# Patient Record
Sex: FEMALE | Race: WHITE | NOT HISPANIC OR LATINO | Employment: UNEMPLOYED | URBAN - NONMETROPOLITAN AREA
[De-identification: names, ages, dates, MRNs, and addresses within clinical notes are randomized per-mention and may not be internally consistent; named-entity substitution may affect disease eponyms.]

---

## 2021-09-16 ENCOUNTER — OFFICE VISIT (OUTPATIENT)
Dept: CARDIOLOGY | Facility: CLINIC | Age: 51
End: 2021-09-16

## 2021-09-16 VITALS
OXYGEN SATURATION: 97 % | BODY MASS INDEX: 47.09 KG/M2 | SYSTOLIC BLOOD PRESSURE: 141 MMHG | TEMPERATURE: 97.3 F | HEIGHT: 66 IN | DIASTOLIC BLOOD PRESSURE: 76 MMHG | HEART RATE: 98 BPM | WEIGHT: 293 LBS

## 2021-09-16 DIAGNOSIS — I10 ESSENTIAL HYPERTENSION: ICD-10-CM

## 2021-09-16 DIAGNOSIS — I45.10 RBBB (RIGHT BUNDLE BRANCH BLOCK): ICD-10-CM

## 2021-09-16 DIAGNOSIS — I73.9 CLAUDICATION (HCC): ICD-10-CM

## 2021-09-16 DIAGNOSIS — G47.33 OSA (OBSTRUCTIVE SLEEP APNEA): ICD-10-CM

## 2021-09-16 DIAGNOSIS — R42 DIZZINESS: ICD-10-CM

## 2021-09-16 DIAGNOSIS — R09.89 DECREASED PEDAL PULSES: ICD-10-CM

## 2021-09-16 DIAGNOSIS — E78.5 HYPERLIPIDEMIA, UNSPECIFIED HYPERLIPIDEMIA TYPE: ICD-10-CM

## 2021-09-16 DIAGNOSIS — R94.31 EKG ABNORMALITIES: Primary | ICD-10-CM

## 2021-09-16 DIAGNOSIS — R60.9 PERIPHERAL EDEMA: ICD-10-CM

## 2021-09-16 DIAGNOSIS — R06.02 SHORTNESS OF BREATH: ICD-10-CM

## 2021-09-16 PROBLEM — J30.2 SEASONAL ALLERGIES: Status: ACTIVE | Noted: 2021-09-16

## 2021-09-16 PROBLEM — E11.42 TYPE 2 DIABETES MELLITUS WITH PERIPHERAL NEUROPATHY: Status: ACTIVE | Noted: 2021-09-16

## 2021-09-16 PROBLEM — E11.9 DM (DIABETES MELLITUS), TYPE 2: Status: ACTIVE | Noted: 2021-09-16

## 2021-09-16 PROBLEM — R60.0 PERIPHERAL EDEMA: Status: ACTIVE | Noted: 2021-09-16

## 2021-09-16 PROBLEM — M54.9 BACK PAIN: Status: ACTIVE | Noted: 2021-09-16

## 2021-09-16 PROCEDURE — 93000 ELECTROCARDIOGRAM COMPLETE: CPT | Performed by: NURSE PRACTITIONER

## 2021-09-16 PROCEDURE — 99204 OFFICE O/P NEW MOD 45 MIN: CPT | Performed by: NURSE PRACTITIONER

## 2021-09-16 RX ORDER — INSULIN DEGLUDEC INJECTION 100 U/ML
100 INJECTION, SOLUTION SUBCUTANEOUS DAILY
COMMUNITY

## 2021-09-16 RX ORDER — ATORVASTATIN CALCIUM 10 MG/1
10 TABLET, FILM COATED ORAL DAILY
COMMUNITY

## 2021-09-16 RX ORDER — SOLIFENACIN SUCCINATE 5 MG/1
5 TABLET, FILM COATED ORAL DAILY
COMMUNITY

## 2021-09-16 RX ORDER — ASPIRIN 81 MG/1
81 TABLET ORAL DAILY
Qty: 90 TABLET | Refills: 3 | Status: SHIPPED | OUTPATIENT
Start: 2021-09-16

## 2021-09-16 RX ORDER — AMLODIPINE BESYLATE 10 MG/1
10 TABLET ORAL DAILY
COMMUNITY

## 2021-09-16 RX ORDER — GABAPENTIN 600 MG/1
600 TABLET ORAL 2 TIMES DAILY
COMMUNITY

## 2021-09-16 RX ORDER — ACETAMINOPHEN 500 MG
500 TABLET ORAL EVERY 6 HOURS PRN
COMMUNITY

## 2021-09-16 RX ORDER — BUMETANIDE 1 MG/1
1 TABLET ORAL AS NEEDED
COMMUNITY

## 2021-09-16 RX ORDER — DULAGLUTIDE 1.5 MG/.5ML
1.5 INJECTION, SOLUTION SUBCUTANEOUS WEEKLY
COMMUNITY
End: 2022-07-18

## 2021-09-16 RX ORDER — DULOXETIN HYDROCHLORIDE 60 MG/1
60 CAPSULE, DELAYED RELEASE ORAL DAILY
COMMUNITY

## 2021-09-16 NOTE — PATIENT INSTRUCTIONS
Right Bundle Branch Block    Right bundle branch block (RBBB) is a problem with the way that electrical impulses pass through the heart (electrical conduction abnormality). The heart depends on an electrical pulse to beat normally. The electrical signal for a heartbeat starts in the upper chambers of the heart (atria) and then travels to the two lower chambers (left and rightventricles). An RBBB is a partial or complete block of the pathway that carries the signal to the right ventricle. If you have RBBB, the right side of your heart beats a little more slowly than the left side.  RBBB may be a warning of heart disease or a lung problem.  What are the causes?  This condition may be caused by:  · Heart attack (myocardial infarction).  · Being born with a heart defect (congenital heart disease).  · A blood clot that flows into the lung (pulmonary embolism).  · Infection of heart muscle (myocarditis).  · High blood pressure.  In some cases, the cause may not be known.  What increases the risk?  The following factors may make you more likely to develop this condition:  · Being male.  · Being 50 years of age or older.  · Having heart disease.  · Having had a heart attack or heart surgery.  · Having an enlarged heart.  · Having a hole in the walls between the chambers of the heart (septal defect).  What are the signs or symptoms?  This condition does not typically cause symptoms.  How is this diagnosed?  This condition may be diagnosed based on an electrocardiogram (ECG). It is often diagnosed when an ECG is done as part of a routine physical.  You may also have imaging tests to find out more about your condition. These may include:  · Chest X-rays.  · Echocardiogram.  How is this treated?  Treatment may not be needed for this condition if you do not have symptoms or any other heart problems. However, you may need to see your health care provider more often because RBBB can be a warning sign of future heart or lung  problems. You may need treatment for another condition that may be causing RBBB.  Follow these instructions at home:  Lifestyle    · Follow instructions from your health care provider about eating or drinking restrictions.  · Follow a heart-healthy diet and maintain a healthy weight. Work with a dietitian to create an eating plan that is best for you.  · Do not use any products that contain nicotine or tobacco, such as cigarettes, e-cigarettes, and chewing tobacco. If you need help quitting, ask your health care provider.    Activity  · Get regular exercise as told by your health care provider.  · Return to your normal activities as told by your health care provider. Ask your health care provider what activities are safe for you.  General instructions  · Take over-the-counter and prescription medicines only as told by your health care provider.  · Keep all follow-up visits as told by your health care provider. This is important.  Contact a health care provider if:  · You are light-headed.  · You faint.  Get help right away if:  · You have chest pain.  · You have trouble breathing.  These symptoms may represent a serious problem that is an emergency. Do not wait to see if the symptoms will go away. Get medical help right away. Call your local emergency services (911 in the [place] Do not drive yourself to the hospital.   Summary  · For the heart to beat normally, an electrical signal must travel to the heart's lower right chamber. Right bundle branch block (RBBB) is a partial or complete block of the pathway that carries that signal.  · This condition does not typically cause symptoms.  · Treatment may not be needed for RBBB if you do not have symptoms or any other heart problems.  · You may need to see your health care provider more often because RBBB can be a warning sign of future heart or lung problems.  This information is not intended to replace advice given to you by your health care provider. Make sure you  discuss any questions you have with your health care provider.  Document Revised: 06/16/2020 Document Reviewed: 06/16/2020  Elsevier Patient Education © 2021 [employer]    Edema    Edema is an abnormal buildup of fluids in the body tissues and under the skin. Swelling of the legs, feet, and ankles is a common symptom that becomes more likely as you get older. Swelling is also common in looser tissues, like around the eyes. When the affected area is squeezed, the fluid may move out of that spot and leave a dent for a few moments. This dent is called pitting edema.  There are many possible causes of edema. Eating too much salt (sodium) and being on your feet or sitting for a long time can cause edema in your legs, feet, and ankles. Hot weather may make edema worse. Common causes of edema include:  · Heart failure.  · Liver or kidney disease.  · Weak leg blood vessels.  · Cancer.  · An injury.  · Pregnancy.  · Medicines.  · Being obese.  · Low protein levels in the blood.  Edema is usually painless. Your skin may look swollen or shiny.  Follow these instructions at home:  · Keep the affected body part raised (elevated) above the level of your heart when you are sitting or lying down.  · Do not sit still or stand for long periods of time.  · Do not wear tight clothing. Do not wear garters on your upper legs.  · Exercise your legs to get your circulation going. This helps to move the fluid back into your blood vessels, and it may help the swelling go down.  · Wear elastic bandages or support stockings to reduce swelling as told by your health care provider.  · Eat a low-salt (low-sodium) diet to reduce fluid as told by your health care provider.  · Depending on the cause of your swelling, you may need to limit how much fluid you drink (fluid restriction).  · Take over-the-counter and prescription medicines only as told by your health care provider.  Contact a health care provider if:  · Your edema does not get better  with treatment.  · You have heart, liver, or kidney disease and have symptoms of edema.  · You have sudden and unexplained weight gain.  Get help right away if:  · You develop shortness of breath or chest pain.  · You cannot breathe when you lie down.  · You develop pain, redness, or warmth in the swollen areas.  · You have heart, liver, or kidney disease and suddenly get edema.  · You have a fever and your symptoms suddenly get worse.  Summary  · Edema is an abnormal buildup of fluids in the body tissues and under the skin.  · Eating too much salt (sodium) and being on your feet or sitting for a long time can cause edema in your legs, feet, and ankles.  · Keep the affected body part raised (elevated) above the level of your heart when you are sitting or lying down.  This information is not intended to replace advice given to you by your health care provider. Make sure you discuss any questions you have with your health care provider.  Document Revised: 05/06/2020 Document Reviewed: 01/20/2018  [employer] Patient Education © 2021 [employer] Inc.

## 2021-09-16 NOTE — PROGRESS NOTES
Subjective   Meri Rankin is a 51 y.o. female     Chief Complaint   Patient presents with   • [facility]    Problem list:    1.  Hypertension  2.  Hyperlipidemia  3.  Claudication  3.1 bilateral extremity arterial ultrasound 11/13/2019-no evidence to indicate high-grade/significant peripheral vascular disease  4.  Right bundle branch block  5.  Type 2 diabetes mellitus for 30 years  6.  Shortness of breath  6.1 echo 1/11/2018-EF 60%, diastolic inflow shows normal relaxation  7.  REBECCA currently no CPAP, Dr. Valle    Patient is a 51-year-old female who presents today as she is needing cardiac clearance for bariatric surgery.  She denies any chest pain, pressure, palpitations, fluttering, presyncope, syncope, orthopnea or PND.  Patient says that she does have dizziness if she is lying down and gets up quickly.  She says she has swelling in her legs and it does not go down.  She says the thing that helps the most is Bumex and elevation.  She says she does get short of breath with more than normal activity.  She says she is fatigued all the time.  Patient says that she is able to walk around the grocery store without having shortness of breath.  Patient does complain of claudication.  She has significant swelling in her legs.    Patient quit smoking 12 years ago but smoked a pack a day for 30 years; no alcohol or illicit drugs  She drinks 6 diet Mountain Dew's per day; no coffee or tea    Mom 79 alive-diabetes, smoker  Dad 78 alive-diabetes, dialysis    Patient's labs show creatinine of 1.5, potassium of 5.2, her LDL was 68, triglycerides 178.  A1c was 9.    Current Outpatient Medications on File Prior to Visit   Medication Sig Dispense Refill   • acetaminophen (TYLENOL) 500 MG tablet Take 500 mg by mouth Every 6 (Six) Hours As Needed for Mild Pain .     • amLODIPine (NORVASC) 10 MG tablet Take 10 mg by mouth Daily.     • atorvastatin (LIPITOR) 10 MG tablet Take 10 mg by mouth Daily.     • bumetanide (BUMEX)  1 MG tablet Take 1 mg by mouth Daily.     • Dulaglutide (Trulicity) 1.5 MG/0.5ML solution pen-injector Inject 1.5 mL under the skin into the appropriate area as directed 1 (One) Time Per Week.     • DULoxetine (CYMBALTA) 60 MG capsule Take 60 mg by mouth Daily. Total of 90 mg - 60 mg and 30 mg tablet in the am together     • empagliflozin (Jardiance) 25 MG tablet tablet Take 25 mg by mouth Daily.     • gabapentin (NEURONTIN) 600 MG tablet Take 600 mg by mouth 2 (Two) Times a Day.     • insulin degludec (Tresiba FlexTouch) 100 UNIT/ML solution pen-injector injection Inject 100 Units under the skin into the appropriate area as directed Daily. Sliding scale     • linaclotide (LINZESS) 145 MCG capsule capsule Take 145 mcg by mouth Every Morning Before Breakfast.     • solifenacin (VESIcare) 5 MG tablet Take 5 mg by mouth Daily.       No current facility-administered medications on file prior to visit.       ALLERGIES    Latex    Past Medical History:   Diagnosis Date   • Chronic kidney disease    • COVID-19 vaccine administered 2021 - Moderna    • Diabetes mellitus (CMS/[facility])    • Glaucoma    • Hyperlipidemia    • Hypertension    • Sleep apnea        Social History     Socioeconomic History   • Marital status: Single     Spouse name: Not on file   • Number of children: Not on file   • Years of education: Not on file   • Highest education level: Not on file   Tobacco Use   • Smoking status: Former Smoker     Packs/day: 1.00     Years: 30.00     Pack years: 30.00     Types: Cigarettes     Quit date: 2012     Years since quittin.5   • Smokeless tobacco: Never Used   Substance and Sexual Activity   • Alcohol use: Never   • Drug use: Never   • Sexual activity: Defer       Family History   Problem Relation Age of Onset   • Hypertension Mother    • Hyperlipidemia Mother    • Diabetes Mother    • Parkinsonism Mother    • Cancer Father    • Kidney failure Father    • Hypertension Father    •  "Hyperlipidemia Father        Review of Systems   Constitutional: Positive for fatigue (tired all the time ). Negative for appetite change, chills and diaphoresis.   HENT: Negative for congestion, rhinorrhea and sore throat.    Eyes: Positive for visual disturbance (glasses ).   Respiratory: Positive for shortness of breath (with more than normal activity, stairs, hills ). Negative for cough, chest tightness and wheezing.    Cardiovascular: Positive for leg swelling (legs, feet and ankles ( swelling goes down at night ); bumex and elevation helps ). Negative for chest pain and palpitations.   Gastrointestinal: Positive for constipation (Chronic pt has meds to help ). Negative for abdominal pain, blood in stool, diarrhea, nausea and vomiting.   Endocrine: Negative for cold intolerance.   Genitourinary: Negative for difficulty urinating, dysuria, frequency, hematuria and urgency.   Musculoskeletal: Positive for back pain (lower ), joint swelling (fingers , thumbs ) and neck stiffness (stiffiness with turning her head either direction she has limited turning ). Negative for arthralgias and neck pain.   Skin: Negative for pallor, rash and wound.   Allergic/Immunologic: Positive for environmental allergies. Negative for food allergies.   Neurological: Positive for dizziness (if lying down and sit up really fast ), weakness (legs) and numbness (hands.legs and feet ). Negative for light-headedness and headaches.   Hematological: Does not bruise/bleed easily.   Psychiatric/Behavioral: Positive for sleep disturbance (sleep apnea , insurance took the machine due to her not using it ).       Objective   /76 (BP Location: Left arm)   Pulse 98   Temp 97.3 °F (36.3 °C)   Ht 167.6 cm (66\")   Wt (!) 150 kg (331 lb)   SpO2 97%   BMI 53.42 kg/m²   Vitals:    09/16/21 1609   BP: 141/76   BP Location: Left arm   Pulse: 98   Temp: 97.3 °F (36.3 °C)   SpO2: 97%   Weight: (!) 150 kg (331 lb)   Height: 167.6 cm (66\")      Lab " Results (most recent)     None        Physical Exam  Vitals reviewed.   Constitutional:       General: She is awake.      Appearance: Normal appearance. She is well-developed and well-groomed. She is morbidly obese.   HENT:      Head: Normocephalic.   Eyes:      General: Lids are normal.   Neck:      Vascular: No carotid bruit, hepatojugular reflux or JVD.   Cardiovascular:      Rate and Rhythm: Normal rate and regular rhythm.      Pulses:           Radial pulses are 2+ on the right side and 2+ on the left side.      Heart sounds: Normal heart sounds.      Comments: Decreased pedal pulses  Pulmonary:      Effort: Pulmonary effort is normal.      Breath sounds: Normal breath sounds and air entry.   Abdominal:      General: Bowel sounds are normal.      Palpations: Abdomen is soft.   Musculoskeletal:      Right lower le+ Pitting Edema present.      Left lower le+ Pitting Edema present.   Skin:     General: Skin is warm and dry.      Findings: Erythema (BLE) present.   Neurological:      Mental Status: She is alert and oriented to person, place, and time.   Psychiatric:         Attention and Perception: Attention and perception normal.         Mood and Affect: Mood and affect normal.         Speech: Speech normal.         Behavior: Behavior normal. Behavior is cooperative.         Thought Content: Thought content normal.         Cognition and Memory: Cognition and memory normal.         Judgment: Judgment normal.         Procedure     ECG 12 Lead    Date/Time: 2021 5:02 PM  Performed by: Natacha Saravia APRN  Authorized by: Natacha Saravia APRN   Comparison: not compared with previous ECG   Rhythm: sinus rhythm  Rate: normal  BPM: 91  Conduction: right bundle branch block  Q waves: V3, V4, II and aVF    QRS axis: normal    Clinical impression: abnormal EKG                 Assessment/Plan      Diagnosis Plan   1. EKG abnormalities  aspirin (aspirin) 81 MG EC tablet    Stress Test With Myocardial Perfusion  One Day   2. Hyperlipidemia, unspecified hyperlipidemia type  Stress Test With Myocardial Perfusion One Day   3. Peripheral edema  Adult Transthoracic Echo Complete W/ Cont if Necessary Per Protocol    Duplex Lower Extremity Art / Grafts - Bilateral CAR   4. Essential hypertension  Stress Test With Myocardial Perfusion One Day    Adult Transthoracic Echo Complete W/ Cont if Necessary Per Protocol    ECG 12 Lead   5. REBECCA (obstructive sleep apnea)  Adult Transthoracic Echo Complete W/ Cont if Necessary Per Protocol   6. RBBB (right bundle branch block)  aspirin (aspirin) 81 MG EC tablet    Stress Test With Myocardial Perfusion One Day   7. Shortness of breath  Stress Test With Myocardial Perfusion One Day    Adult Transthoracic Echo Complete W/ Cont if Necessary Per Protocol   8. Decreased pedal pulses  Duplex Lower Extremity Art / Grafts - Bilateral CAR   9. Claudication (CMS/HCC)     10. Dizziness  Duplex Carotid Ultrasound CAR       Return in about 12 weeks (around 12/9/2021).    EKG abnormalities/hyperlipidemia/peripheral edema/hypertension/REBECCA/right bundle branch block/shortness of breath-patient will have an ischemia work-up, stress and echo.  She will start aspirin 81.  Decreased pedal pulses/claudication-patient on bilateral extremity arterial ultrasound.  Dizziness-patient have carotid artery ultrasound.  Patient needs her testing done as soon as possible as she has insurance change at the end of the month.  We will try to get her testing performed before then.  She will continue her medication regimen with above-noted change.  She will follow-up in 12 weeks or sooner if any changes or abnormalities with testing.       Meri Rankin  reports that she quit smoking about 9 years ago. Her smoking use included cigarettes. She has a 30.00 pack-year smoking history. She has never used smokeless tobacco..Patient brought in medicine list to appointment, it's been reviewed with patient and med list was updated in the  chart.     Electronically signed by:

## 2021-09-17 ENCOUNTER — TELEPHONE (OUTPATIENT)
Dept: CARDIOLOGY | Facility: CLINIC | Age: 51
End: 2021-09-17

## 2021-09-17 PROBLEM — R42 DIZZINESS: Status: ACTIVE | Noted: 2021-09-17

## 2021-09-17 NOTE — TELEPHONE ENCOUNTER
CARDIAC CLEARANCE RECEIVED THIS DATE, SCANNED, AND PLACED IN CLINICAL STAFF'S MAILBOX.    [facility] [facility] WEIGHT LOSS SURGERY  GASTRIC SLEEVE  09/28/2021

## 2021-09-23 ENCOUNTER — HOSPITAL ENCOUNTER (OUTPATIENT)
Dept: CARDIOLOGY | Facility: HOSPITAL | Age: 51
Discharge: HOME | End: 2021-09-23

## 2021-09-23 DIAGNOSIS — I10 ESSENTIAL HYPERTENSION: ICD-10-CM

## 2021-09-23 DIAGNOSIS — R06.02 SHORTNESS OF BREATH: ICD-10-CM

## 2021-09-23 DIAGNOSIS — E78.5 HYPERLIPIDEMIA, UNSPECIFIED HYPERLIPIDEMIA TYPE: ICD-10-CM

## 2021-09-23 DIAGNOSIS — R60.9 PERIPHERAL EDEMA: ICD-10-CM

## 2021-09-23 DIAGNOSIS — R09.89 DECREASED PEDAL PULSES: ICD-10-CM

## 2021-09-23 DIAGNOSIS — G47.33 OSA (OBSTRUCTIVE SLEEP APNEA): ICD-10-CM

## 2021-09-23 DIAGNOSIS — I45.10 RBBB (RIGHT BUNDLE BRANCH BLOCK): ICD-10-CM

## 2021-09-23 DIAGNOSIS — R42 DIZZINESS: ICD-10-CM

## 2021-09-23 DIAGNOSIS — R94.31 EKG ABNORMALITIES: ICD-10-CM

## 2021-09-23 PROCEDURE — 25010000002 REGADENOSON 0.4 MG/5ML SOLUTION: Performed by: INTERNAL MEDICINE

## 2021-09-23 PROCEDURE — 93018 CV STRESS TEST I&R ONLY: CPT | Performed by: SPECIALIST

## 2021-09-23 PROCEDURE — 93306 TTE W/DOPPLER COMPLETE: CPT

## 2021-09-23 PROCEDURE — A9500 TC99M SESTAMIBI: HCPCS | Performed by: INTERNAL MEDICINE

## 2021-09-23 PROCEDURE — 93017 CV STRESS TEST TRACING ONLY: CPT

## 2021-09-23 PROCEDURE — 93925 LOWER EXTREMITY STUDY: CPT | Performed by: INTERNAL MEDICINE

## 2021-09-23 PROCEDURE — 93880 EXTRACRANIAL BILAT STUDY: CPT

## 2021-09-23 PROCEDURE — 78452 HT MUSCLE IMAGE SPECT MULT: CPT | Performed by: SPECIALIST

## 2021-09-23 PROCEDURE — 93880 EXTRACRANIAL BILAT STUDY: CPT | Performed by: INTERNAL MEDICINE

## 2021-09-23 PROCEDURE — 0 TECHNETIUM SESTAMIBI: Performed by: INTERNAL MEDICINE

## 2021-09-23 PROCEDURE — 78452 HT MUSCLE IMAGE SPECT MULT: CPT

## 2021-09-23 PROCEDURE — 93925 LOWER EXTREMITY STUDY: CPT

## 2021-09-23 PROCEDURE — 93306 TTE W/DOPPLER COMPLETE: CPT | Performed by: SPECIALIST

## 2021-09-23 RX ADMIN — TECHNETIUM TC 99M SESTAMIBI 1 DOSE: 1 INJECTION INTRAVENOUS at 10:22

## 2021-09-23 RX ADMIN — TECHNETIUM TC 99M SESTAMIBI 1 DOSE: 1 INJECTION INTRAVENOUS at 08:34

## 2021-09-23 RX ADMIN — REGADENOSON 0.4 MG: 0.08 INJECTION, SOLUTION INTRAVENOUS at 10:22

## 2021-09-24 ENCOUNTER — TELEPHONE (OUTPATIENT)
Dept: CARDIOLOGY | Facility: CLINIC | Age: 51
End: 2021-09-24

## 2021-09-24 LAB
BH CV ECHO MEAS - ACS: 2.1 CM
BH CV ECHO MEAS - AO MAX PG: 8 MMHG
BH CV ECHO MEAS - AO MEAN PG: 5 MMHG
BH CV ECHO MEAS - AO ROOT AREA (BSA CORRECTED): 1.2
BH CV ECHO MEAS - AO ROOT AREA: 7.3 CM^2
BH CV ECHO MEAS - AO ROOT DIAM: 3.1 CM
BH CV ECHO MEAS - AO V2 MAX: 141 CM/SEC
BH CV ECHO MEAS - AO V2 MEAN: 109 CM/SEC
BH CV ECHO MEAS - AO V2 VTI: 33.5 CM
BH CV ECHO MEAS - BSA(HAYCOCK): 2.7 M^2
BH CV ECHO MEAS - BSA: 2.5 M^2
BH CV ECHO MEAS - BZI_BMI: 53.4 KILOGRAMS/M^2
BH CV ECHO MEAS - BZI_METRIC_HEIGHT: 167.6 CM
BH CV ECHO MEAS - BZI_METRIC_WEIGHT: 150.1 KG
BH CV ECHO MEAS - EDV(CUBED): 59.3 ML
BH CV ECHO MEAS - EDV(MOD-SP4): 123 ML
BH CV ECHO MEAS - EDV(TEICH): 65.9 ML
BH CV ECHO MEAS - EF(CUBED): 69.3 %
BH CV ECHO MEAS - EF(MOD-SP4): 62.4 %
BH CV ECHO MEAS - EF(TEICH): 61.6 %
BH CV ECHO MEAS - ESV(CUBED): 18.2 ML
BH CV ECHO MEAS - ESV(MOD-SP4): 46.3 ML
BH CV ECHO MEAS - ESV(TEICH): 25.3 ML
BH CV ECHO MEAS - FS: 32.6 %
BH CV ECHO MEAS - IVS/LVPW: 0.95
BH CV ECHO MEAS - IVSD: 1.6 CM
BH CV ECHO MEAS - LA DIMENSION: 4.5 CM
BH CV ECHO MEAS - LA/AO: 1.5
BH CV ECHO MEAS - LV DIASTOLIC VOL/BSA (35-75): 49.6 ML/M^2
BH CV ECHO MEAS - LV IVRT: 0.1 SEC
BH CV ECHO MEAS - LV MASS(C)D: 264.4 GRAMS
BH CV ECHO MEAS - LV MASS(C)DI: 106.7 GRAMS/M^2
BH CV ECHO MEAS - LV SYSTOLIC VOL/BSA (12-30): 18.7 ML/M^2
BH CV ECHO MEAS - LVIDD: 3.9 CM
BH CV ECHO MEAS - LVIDS: 2.6 CM
BH CV ECHO MEAS - LVLD AP4: 9.1 CM
BH CV ECHO MEAS - LVLS AP4: 7.8 CM
BH CV ECHO MEAS - LVOT AREA (M): 4.2 CM^2
BH CV ECHO MEAS - LVOT AREA: 4.2 CM^2
BH CV ECHO MEAS - LVOT DIAM: 2.3 CM
BH CV ECHO MEAS - LVPWD: 1.7 CM
BH CV ECHO MEAS - MV A MAX VEL: 99.8 CM/SEC
BH CV ECHO MEAS - MV DEC SLOPE: 277 CM/SEC^2
BH CV ECHO MEAS - MV E MAX VEL: 86.5 CM/SEC
BH CV ECHO MEAS - MV E/A: 0.87
BH CV ECHO MEAS - RVDD: 2.4 CM
BH CV ECHO MEAS - SI(AO): 98.8 ML/M^2
BH CV ECHO MEAS - SI(CUBED): 16.6 ML/M^2
BH CV ECHO MEAS - SI(MOD-SP4): 31 ML/M^2
BH CV ECHO MEAS - SI(TEICH): 16.4 ML/M^2
BH CV ECHO MEAS - SV(AO): 244.8 ML
BH CV ECHO MEAS - SV(CUBED): 41.1 ML
BH CV ECHO MEAS - SV(MOD-SP4): 76.7 ML
BH CV ECHO MEAS - SV(TEICH): 40.6 ML
BH CV NUCLEAR PRIOR STUDY: 3
BH CV REST NUCLEAR ISOTOPE DOSE: 10 MCI
BH CV STRESS COMMENTS STAGE 1: NORMAL
BH CV STRESS DOSE REGADENOSON STAGE 1: 0.4
BH CV STRESS DURATION MIN STAGE 1: 0
BH CV STRESS DURATION SEC STAGE 1: 10
BH CV STRESS NUCLEAR ISOTOPE DOSE: 30 MCI
BH CV STRESS PROTOCOL 1: NORMAL
BH CV STRESS RECOVERY BP: NORMAL MMHG
BH CV STRESS RECOVERY HR: 90 BPM
BH CV STRESS STAGE 1: 1
LV EF NUC BP: 51 %
MAXIMAL PREDICTED HEART RATE: 167 BPM
MAXIMAL PREDICTED HEART RATE: 167 BPM
PERCENT MAX PREDICTED HR: 54.49 %
STRESS BASELINE BP: NORMAL MMHG
STRESS BASELINE HR: 86 BPM
STRESS PERCENT HR: 64 %
STRESS POST PEAK BP: NORMAL MMHG
STRESS POST PEAK HR: 91 BPM
STRESS TARGET HR: 142 BPM
STRESS TARGET HR: 142 BPM

## 2021-09-24 NOTE — TELEPHONE ENCOUNTER
----- Message from HAROON Michelle sent at 9/24/2021  9:12 AM EDT -----  Please advise patient. Ok to clear for procedure.         Left mess for pt regarding her stress test and Echo results and that Cardiac Clearance was sent to  [facility]     Echo-ejection fraction appears to be 56 - 60%    Stress test - Findings consistent with a normal ECG stress test.    XAVIER Singletary

## 2021-09-26 LAB
BH CV ECHO MEAS - BSA(HAYCOCK): 2.7 M^2
BH CV ECHO MEAS - BSA(HAYCOCK): 2.7 M^2
BH CV ECHO MEAS - BSA: 2.5 M^2
BH CV ECHO MEAS - BSA: 2.5 M^2
BH CV ECHO MEAS - BZI_BMI: 53.4 KILOGRAMS/M^2
BH CV ECHO MEAS - BZI_BMI: 53.4 KILOGRAMS/M^2
BH CV ECHO MEAS - BZI_METRIC_HEIGHT: 167.6 CM
BH CV ECHO MEAS - BZI_METRIC_HEIGHT: 167.6 CM
BH CV ECHO MEAS - BZI_METRIC_WEIGHT: 150.1 KG
BH CV ECHO MEAS - BZI_METRIC_WEIGHT: 150.1 KG
BH CV LEA LEFT ANT TIBIAL A DISTAL PSV: 103 CM/S
BH CV LEA LEFT CFA PROX PSV: 172 CM/S
BH CV LEA LEFT DFA PROX PSV: 54 CM/S
BH CV LEA LEFT POPITEAL A  PROX PSV: 91 CM/S
BH CV LEA LEFT PTA DISTAL PSV: 87 CM/S
BH CV LEA LEFT SFA MID PSV: 161 CM/S
BH CV LEA LEFT SFA PROX PSV: 133 CM/S
BH CV LEA RIGHT ANT TIBIAL A DISTAL PSV: 97 CM/S
BH CV LEA RIGHT CFA PROX PSV: 150 CM/S
BH CV LEA RIGHT DFA PROX PSV: 96 CM/S
BH CV LEA RIGHT POPITEAL A  PROX PSV: 68 CM/S
BH CV LEA RIGHT PTA DISTAL PSV: 86 CM/S
BH CV LEA RIGHT SFA MID PSV: 145 CM/S
BH CV LEA RIGHT SFA PROX PSV: 142 CM/S
BH CV XLRA MEAS LEFT BULB EDV: -13.1 CM/SEC
BH CV XLRA MEAS LEFT BULB PSV: -57.2 CM/SEC
BH CV XLRA MEAS LEFT CCA RATIO VEL: -60.2 CM/SEC
BH CV XLRA MEAS LEFT DIST CCA EDV: -13.5 CM/SEC
BH CV XLRA MEAS LEFT DIST CCA PSV: -60.7 CM/SEC
BH CV XLRA MEAS LEFT ICA RATIO VEL: -96.4 CM/SEC
BH CV XLRA MEAS LEFT ICA/CCA RATIO: 1.6
BH CV XLRA MEAS LEFT MID ICA EDV: -23.7 CM/SEC
BH CV XLRA MEAS LEFT MID ICA PSV: -66.1 CM/SEC
BH CV XLRA MEAS LEFT PROX CCA EDV: 8.8 CM/SEC
BH CV XLRA MEAS LEFT PROX CCA PSV: 108.7 CM/SEC
BH CV XLRA MEAS LEFT PROX ECA EDV: -8.4 CM/SEC
BH CV XLRA MEAS LEFT PROX ECA PSV: -113.8 CM/SEC
BH CV XLRA MEAS LEFT PROX ICA EDV: -30 CM/SEC
BH CV XLRA MEAS LEFT PROX ICA PSV: -97.1 CM/SEC
BH CV XLRA MEAS LEFT VERTEBRAL A EDV: 11.7 CM/SEC
BH CV XLRA MEAS LEFT VERTEBRAL A PSV: 43 CM/SEC
BH CV XLRA MEAS RIGHT BULB EDV: -14.7 CM/SEC
BH CV XLRA MEAS RIGHT BULB PSV: -67.3 CM/SEC
BH CV XLRA MEAS RIGHT CCA RATIO VEL: -60.9 CM/SEC
BH CV XLRA MEAS RIGHT DIST CCA EDV: -13.8 CM/SEC
BH CV XLRA MEAS RIGHT DIST CCA PSV: -61.4 CM/SEC
BH CV XLRA MEAS RIGHT DIST ICA EDV: -19.7 CM/SEC
BH CV XLRA MEAS RIGHT DIST ICA PSV: -63.1 CM/SEC
BH CV XLRA MEAS RIGHT ICA RATIO VEL: -74.4 CM/SEC
BH CV XLRA MEAS RIGHT ICA/CCA RATIO: 1.2
BH CV XLRA MEAS RIGHT MID ICA EDV: -21.5 CM/SEC
BH CV XLRA MEAS RIGHT MID ICA PSV: -74.4 CM/SEC
BH CV XLRA MEAS RIGHT PROX CCA EDV: 9.3 CM/SEC
BH CV XLRA MEAS RIGHT PROX CCA PSV: 79.1 CM/SEC
BH CV XLRA MEAS RIGHT PROX ECA EDV: -13.2 CM/SEC
BH CV XLRA MEAS RIGHT PROX ECA PSV: -87.7 CM/SEC
BH CV XLRA MEAS RIGHT PROX ICA EDV: -21.5 CM/SEC
BH CV XLRA MEAS RIGHT PROX ICA PSV: -75 CM/SEC
BH CV XLRA MEAS RIGHT VERTEBRAL A EDV: 6.3 CM/SEC
BH CV XLRA MEAS RIGHT VERTEBRAL A PSV: 27.2 CM/SEC
DIST ATA PSV LEFT: 103.4 CM/SEC
DIST ATA PSV RIGHT: 97.6 CM/SEC
DIST PTA PSV LEFT: 88.2 CM/SEC
DIST PTA PSV RIGHT: 85.8 CM/SEC
LEFT CFA PROX SYS PSV: 172.9 CM/SEC
MAXIMAL PREDICTED HEART RATE: 167 BPM
MAXIMAL PREDICTED HEART RATE: 167 BPM
MID SFA PSV LEFT: -161.5 CM/SEC
MID SFA PSV RIGHT: -146.3 CM/SEC
PROX PFA PSV LEFT: -54.5 CM/SEC
PROX PFA PSV RIGHT: 95.6 CM/SEC
PROX SFA PSV LEFT: 133.6 CM/SEC
PROX SFA PSV RIGHT: 142 CM/SEC
RIGHT CFA PROX SYS PSV: 150 CM/SEC
STRESS TARGET HR: 142 BPM
STRESS TARGET HR: 142 BPM

## 2021-09-27 ENCOUNTER — TELEPHONE (OUTPATIENT)
Dept: CARDIOLOGY | Facility: CLINIC | Age: 51
End: 2021-09-27

## 2021-09-27 NOTE — PROGRESS NOTES
Please advise patient, on ASA and statin. 
Include Location In Plan?: No
Detail Level: Zone
Detail Level: Simple

## 2021-09-27 NOTE — TELEPHONE ENCOUNTER
----- Message from HAROON Michelle sent at 9/27/2021  6:13 AM EDT -----  Please advise patient, on ASA and statin.

## 2021-09-27 NOTE — TELEPHONE ENCOUNTER
Pt notified of no acute findings. Provider will discuss results at f/u. Pt reminded of appt date and time.

## 2022-02-09 ENCOUNTER — TELEPHONE (OUTPATIENT)
Dept: CARDIOLOGY | Facility: CLINIC | Age: 52
End: 2022-02-09

## 2022-02-09 NOTE — TELEPHONE ENCOUNTER
Have been unable to reach pt regd appt change due to Natacha being out on 3/18/22. Updated appt and mailed info to pt.

## 2022-03-28 ENCOUNTER — TELEPHONE (OUTPATIENT)
Dept: CARDIOLOGY | Facility: CLINIC | Age: 52
End: 2022-03-28

## 2022-03-28 NOTE — TELEPHONE ENCOUNTER
Pt called and left message on Triage VM about needing to reschedule ramo time for tomorrow.I tried contacting pt, no answer left VM to return call.

## 2022-03-29 ENCOUNTER — OFFICE VISIT (OUTPATIENT)
Dept: CARDIOLOGY | Facility: CLINIC | Age: 52
End: 2022-03-29

## 2022-03-29 VITALS
SYSTOLIC BLOOD PRESSURE: 146 MMHG | HEART RATE: 89 BPM | OXYGEN SATURATION: 98 % | BODY MASS INDEX: 47.09 KG/M2 | HEIGHT: 66 IN | TEMPERATURE: 98.1 F | DIASTOLIC BLOOD PRESSURE: 87 MMHG | WEIGHT: 293 LBS

## 2022-03-29 DIAGNOSIS — I45.10 RBBB (RIGHT BUNDLE BRANCH BLOCK): ICD-10-CM

## 2022-03-29 DIAGNOSIS — R09.89 DECREASED PEDAL PULSES: ICD-10-CM

## 2022-03-29 DIAGNOSIS — I51.89 GRADE I DIASTOLIC DYSFUNCTION: ICD-10-CM

## 2022-03-29 DIAGNOSIS — G47.33 OSA (OBSTRUCTIVE SLEEP APNEA): ICD-10-CM

## 2022-03-29 DIAGNOSIS — I65.23 BILATERAL CAROTID ARTERY STENOSIS: ICD-10-CM

## 2022-03-29 DIAGNOSIS — I73.9 ASYMPTOMATIC PVD (PERIPHERAL VASCULAR DISEASE): ICD-10-CM

## 2022-03-29 DIAGNOSIS — R42 DIZZINESS: ICD-10-CM

## 2022-03-29 DIAGNOSIS — I10 ESSENTIAL HYPERTENSION: Primary | ICD-10-CM

## 2022-03-29 DIAGNOSIS — R07.2 PRECORDIAL PAIN: ICD-10-CM

## 2022-03-29 DIAGNOSIS — E78.5 HYPERLIPIDEMIA, UNSPECIFIED HYPERLIPIDEMIA TYPE: ICD-10-CM

## 2022-03-29 DIAGNOSIS — R06.02 SHORTNESS OF BREATH: ICD-10-CM

## 2022-03-29 DIAGNOSIS — R60.9 PERIPHERAL EDEMA: ICD-10-CM

## 2022-03-29 PROCEDURE — 99214 OFFICE O/P EST MOD 30 MIN: CPT | Performed by: NURSE PRACTITIONER

## 2022-03-29 RX ORDER — CARVEDILOL 3.12 MG/1
3.12 TABLET ORAL 2 TIMES DAILY
Qty: 180 TABLET | Refills: 3 | Status: SHIPPED | OUTPATIENT
Start: 2022-03-29 | End: 2022-07-18 | Stop reason: SDUPTHER
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
## 2022-03-30 ENCOUNTER — TELEPHONE (OUTPATIENT)
Dept: CARDIOLOGY | Facility: CLINIC | Age: 52
End: 2022-03-30

## 2022-03-30 NOTE — TELEPHONE ENCOUNTER
----- Message from HAROON Michelle sent at 3/30/2022  1:42 PM EDT -----  Please advise patient, on ASA and statin.       Pt was advised of U/S results :     2.  Less than or equal to 20% stenosis by Doppler in the proximal and mid right superficial femoral artery.  The distal right superficial femoral artery could not be evaluated..  20 to 49% stenosis by Doppler in the mid left superficial femoral artery.  The distal vessel cannot be sampled      Pt states that she is taking all her medication      XAVIER Singletary

## 2022-07-18 ENCOUNTER — OFFICE VISIT (OUTPATIENT)
Dept: CARDIOLOGY | Facility: CLINIC | Age: 52
End: 2022-07-18

## 2022-07-18 VITALS
DIASTOLIC BLOOD PRESSURE: 93 MMHG | BODY MASS INDEX: 44.36 KG/M2 | OXYGEN SATURATION: 98 % | SYSTOLIC BLOOD PRESSURE: 149 MMHG | WEIGHT: 276 LBS | HEIGHT: 66 IN | HEART RATE: 82 BPM | TEMPERATURE: 97.6 F

## 2022-07-18 DIAGNOSIS — J30.2 SEASONAL ALLERGIES: ICD-10-CM

## 2022-07-18 DIAGNOSIS — R06.02 SHORTNESS OF BREATH: ICD-10-CM

## 2022-07-18 DIAGNOSIS — I10 ESSENTIAL HYPERTENSION: ICD-10-CM

## 2022-07-18 DIAGNOSIS — I65.23 BILATERAL CAROTID ARTERY STENOSIS: Primary | ICD-10-CM

## 2022-07-18 DIAGNOSIS — I73.9 ASYMPTOMATIC PVD (PERIPHERAL VASCULAR DISEASE): ICD-10-CM

## 2022-07-18 DIAGNOSIS — R60.9 PERIPHERAL EDEMA: ICD-10-CM

## 2022-07-18 DIAGNOSIS — G47.33 OSA (OBSTRUCTIVE SLEEP APNEA): ICD-10-CM

## 2022-07-18 DIAGNOSIS — I51.89 GRADE I DIASTOLIC DYSFUNCTION: ICD-10-CM

## 2022-07-18 DIAGNOSIS — E78.5 HYPERLIPIDEMIA, UNSPECIFIED HYPERLIPIDEMIA TYPE: ICD-10-CM

## 2022-07-18 PROCEDURE — 99214 OFFICE O/P EST MOD 30 MIN: CPT | Performed by: NURSE PRACTITIONER

## 2022-07-18 RX ORDER — FLUTICASONE PROPIONATE 50 MCG
2 SPRAY, SUSPENSION (ML) NASAL DAILY
Qty: 15.8 ML | Refills: 5 | Status: SHIPPED | OUTPATIENT
Start: 2022-07-18 | End: 2022-11-02

## 2022-07-18 RX ORDER — CLONIDINE HYDROCHLORIDE 0.1 MG/1
0.1 TABLET ORAL 3 TIMES DAILY PRN
Qty: 45 TABLET | Refills: 5 | Status: SHIPPED | OUTPATIENT
Start: 2022-07-18

## 2022-07-18 RX ORDER — CARVEDILOL 6.25 MG/1
6.25 TABLET ORAL 2 TIMES DAILY
Qty: 180 TABLET | Refills: 3 | Status: SHIPPED | OUTPATIENT
Start: 2022-07-18 | End: 2022-11-02 | Stop reason: ALTCHOICE

## 2022-07-18 NOTE — PROGRESS NOTES
Subjective   Meri Rankin is a 52 y.o. female     Chief Complaint   Patient presents with   • Follow-up       HPI    Problem list:    1.  Hypertension  2.  Hyperlipidemia  3.  Claudication  3.1 bilateral extremity arterial ultrasound 11/13/2019-no evidence to indicate high-grade/significant peripheral vascular disease  3.2 bilateral lower extremity arterial ultrasound 9/23/2021-no echo Doppler evidence of high-grade stenosis in either leg in any sample area  4.  Right bundle branch block  5.  Type 2 diabetes mellitus for 30 years  6.  Shortness of breath  6.1 echo 1/11/2018-EF 60%, diastolic inflow shows normal relaxation  6.2 Echo 9/23/2021-EF 56 to 60%, moderate LVH, diastolic dysfunction 1  7.  REBECCA currently no CPAP, Dr. Valle  8.  Stress test 9/23/2021-low risk study, no ischemia seen, post stress EF 51%  9.  Carotid artery disease  9.1 carotid artery ultrasound 9/23/2021-less than recoded 15% stenosis right internal carotid artery, and as high as 16 to 49% stenosis in the proximal left internal carotid artery, antegrade flow both vertebral arteries    Patient is a 52-year-old female who presents today for a follow-up.  She walked in today as her appointment was tomorrow but she needed to be seen today she was unable to make it tomorrow.  Patient denies any chest pain or pressure.  She says she has palpitations just every once in a while.  She says she does have dizziness when she gets up or turns quickly.  She does have problems with allergy and sinuses.  She denies any presyncope, syncope, orthopnea or PND.  She says her swelling has been much better.  She only uses her Bumex maybe twice a week.  She says her shortness of breath has been much improved as well.  She does have significant fatigue.  Patient says that her blood pressure still gets elevated on occasion.  She says the other day was 195 systolic.  This was after she had already had her medications.  She says the lowest runs is 120 over  80s.    Current Outpatient Medications on File Prior to Visit   Medication Sig Dispense Refill   • acetaminophen (TYLENOL) 500 MG tablet Take 500 mg by mouth Every 6 (Six) Hours As Needed for Mild Pain .     • amLODIPine (NORVASC) 10 MG tablet Take 10 mg by mouth Daily.     • aspirin (aspirin) 81 MG EC tablet Take 1 tablet by mouth Daily. 90 tablet 3   • atorvastatin (LIPITOR) 10 MG tablet Take 10 mg by mouth Daily.     • bumetanide (BUMEX) 1 MG tablet Take 1 mg by mouth Daily.     • DULoxetine (CYMBALTA) 60 MG capsule Take 60 mg by mouth Daily. Total of 90 mg - 60 mg and 30 mg tablet in the am together     • empagliflozin (JARDIANCE) 25 MG tablet tablet Take 25 mg by mouth Daily.     • gabapentin (NEURONTIN) 600 MG tablet Take 600 mg by mouth 2 (Two) Times a Day.     • insulin degludec (Tresiba FlexTouch) 100 UNIT/ML solution pen-injector injection Inject 100 Units under the skin into the appropriate area as directed Daily. Sliding scale     • linaclotide (LINZESS) 145 MCG capsule capsule Take 145 mcg by mouth Every Morning Before Breakfast. PRN     • solifenacin (VESICARE) 5 MG tablet Take 5 mg by mouth Daily.     • [DISCONTINUED] carvedilol (COREG) 3.125 MG tablet Take 1 tablet by mouth 2 (Two) Times a Day. 180 tablet 3   • [DISCONTINUED] Dulaglutide (Trulicity) 1.5 MG/0.5ML solution pen-injector Inject 1.5 mL under the skin into the appropriate area as directed 1 (One) Time Per Week.       No current facility-administered medications on file prior to visit.       ALLERGIES    Latex    Past Medical History:   Diagnosis Date   • Chronic kidney disease    • COVID-19 vaccine administered 1st- 08/2021 2nd - 09/2021 - Moderna    • Diabetes mellitus (HCC)    • Glaucoma    • Hyperlipidemia    • Hypertension    • Sleep apnea        Social History     Socioeconomic History   • Marital status: Single   Tobacco Use   • Smoking status: Former Smoker     Packs/day: 1.00     Years: 30.00     Pack years: 30.00     Types:  Cigarettes     Quit date: 2/16/2012     Years since quitting: 10.4   • Smokeless tobacco: Never Used   Vaping Use   • Vaping Use: Never used   Substance and Sexual Activity   • Alcohol use: Never   • Drug use: Never   • Sexual activity: Defer       Family History   Problem Relation Age of Onset   • Hypertension Mother    • Hyperlipidemia Mother    • Diabetes Mother    • Parkinsonism Mother    • Cancer Father    • Kidney failure Father    • Hypertension Father    • Hyperlipidemia Father        Review of Systems   Constitutional: Positive for fatigue (sever fatigue ). Negative for appetite change, chills, diaphoresis and fever.   HENT: Positive for congestion (in the am when she gets up it last until she takes a shower then she is ok ). Negative for rhinorrhea and sore throat.    Eyes: Positive for visual disturbance (glasses ).   Respiratory: Positive for shortness of breath (much better ). Negative for cough, chest tightness and wheezing.    Cardiovascular: Positive for palpitations (flutterin g, heart races and pounds ) and leg swelling (legs, feet and ankles swelling does not go down at night; only using bumex as needed only; uses twice a week ). Negative for chest pain.   Gastrointestinal: Positive for constipation (at times , she has meds to help ). Negative for abdominal pain, blood in stool, diarrhea, nausea and vomiting.   Endocrine: Negative for cold intolerance and heat intolerance.   Genitourinary: Negative for difficulty urinating, dysuria, frequency, hematuria and urgency.   Musculoskeletal: Positive for back pain (lower , middle and upper back ), joint swelling (knee's , hips ), neck pain (back and sides of the neck ) and neck stiffness (stiffness at times when she tries to turn her head or look up or down ). Negative for arthralgias.   Skin: Negative for color change, pallor, rash and wound.   Allergic/Immunologic: Positive for environmental allergies (tree's , grass and seasonal ). Negative for food  "allergies.   Neurological: Positive for dizziness (when she gets up to quick , turns or bends down and raises up; sinus and allergy issues), weakness (legs, weakness ) and numbness (hands , from knees's down ). Negative for light-headedness and headaches.   Hematological: Does not bruise/bleed easily.   Psychiatric/Behavioral: Positive for sleep disturbance (pt is getting a cpac machine ).       Objective   /93 (BP Location: Left arm, Patient Position: Sitting)   Pulse 82   Temp 97.6 °F (36.4 °C)   Ht 167.6 cm (66\")   Wt 125 kg (276 lb)   SpO2 98%   BMI 44.55 kg/m²   Vitals:    07/18/22 1546   BP: 149/93   BP Location: Left arm   Patient Position: Sitting   Pulse: 82   Temp: 97.6 °F (36.4 °C)   SpO2: 98%   Weight: 125 kg (276 lb)   Height: 167.6 cm (66\")      Lab Results (most recent)     None        Physical Exam  Vitals reviewed.   Constitutional:       General: She is awake.      Appearance: Normal appearance. She is well-developed and well-groomed. She is morbidly obese.   HENT:      Head: Normocephalic.   Eyes:      General: Lids are normal.      Comments: Wears glasses    Neck:      Vascular: No carotid bruit, hepatojugular reflux or JVD.   Cardiovascular:      Rate and Rhythm: Normal rate and regular rhythm.      Pulses:           Radial pulses are 2+ on the right side and 2+ on the left side.        Dorsalis pedis pulses are 2+ on the right side and 2+ on the left side.        Posterior tibial pulses are 2+ on the right side and 2+ on the left side.      Heart sounds: Normal heart sounds.      Comments: pvd changes BLE  Pulmonary:      Effort: Pulmonary effort is normal.      Breath sounds: Normal breath sounds and air entry.   Abdominal:      General: Bowel sounds are normal.      Palpations: Abdomen is soft.   Musculoskeletal:      Right lower leg: Edema (trace - 1+ ) present.      Left lower leg: Edema (trace - 1+) present.   Skin:     General: Skin is warm and dry.   Neurological:      Mental " Status: She is alert and oriented to person, place, and time.   Psychiatric:         Attention and Perception: Attention and perception normal.         Mood and Affect: Mood and affect normal.         Speech: Speech normal.         Behavior: Behavior normal. Behavior is cooperative.         Thought Content: Thought content normal.         Cognition and Memory: Cognition and memory normal.         Judgment: Judgment normal.         Procedure   Procedures         Assessment & Plan      Diagnosis Plan   1. Bilateral carotid artery stenosis     2. Essential hypertension  carvedilol (COREG) 6.25 MG tablet    cloNIDine (Catapres) 0.1 MG tablet   3. Asymptomatic PVD (peripheral vascular disease) ([facility])     4. Grade I diastolic dysfunction     5. Hyperlipidemia, unspecified hyperlipidemia type     6. Shortness of breath     7. REBECCA (obstructive sleep apnea)     8. Peripheral edema     9. Seasonal allergies  fluticasone (Flonase) 50 MCG/ACT nasal spray       Return in about 3 months (around 10/18/2022).    Carotid artery disease-patient's on aspirin and statin.  Hypertension-patient will increase her carvedilol to 6.25 twice daily.  She will continue her amlodipine.  She will use clonidine 0.1 3 times daily as needed for SBP greater than 160 or DBP greater than 90.  PVD-patient's on aspirin and statin.  Diastolic dysfunction is peripheral edema-patient will use Bumex as needed.  Hyperlipidemia-patient's on Lipitor and doing well.  Shortness of breath-improved.  REBECCA-patient does not use CPAP.  Seasonal allergies-sent in Flonase.  Patient will continue her medication regimen above-noted changes.  She will follow-up in 3 months or sooner if any changes.  I advised patient if her blood pressure stays elevated over several days to contact her office and we will make further changes.       Meri Rankin  reports that she quit smoking about 10 years ago. Her smoking use included cigarettes. She has a 30.00 pack-year smoking history.  She has never used smokeless tobacco.. Patient brought in medicine list to appointment, it's been reviewed with patient and med list was updated in the chart.      Electronically signed by:

## 2022-11-02 ENCOUNTER — OFFICE VISIT (OUTPATIENT)
Dept: CARDIOLOGY | Facility: CLINIC | Age: 52
End: 2022-11-02

## 2022-11-02 VITALS
OXYGEN SATURATION: 97 % | SYSTOLIC BLOOD PRESSURE: 150 MMHG | WEIGHT: 292 LBS | HEIGHT: 66 IN | TEMPERATURE: 97.8 F | BODY MASS INDEX: 46.93 KG/M2 | HEART RATE: 95 BPM | DIASTOLIC BLOOD PRESSURE: 88 MMHG

## 2022-11-02 DIAGNOSIS — R07.89 OTHER CHEST PAIN: Primary | ICD-10-CM

## 2022-11-02 DIAGNOSIS — R60.9 PERIPHERAL EDEMA: ICD-10-CM

## 2022-11-02 DIAGNOSIS — G47.33 OSA (OBSTRUCTIVE SLEEP APNEA): ICD-10-CM

## 2022-11-02 DIAGNOSIS — I51.89 GRADE I DIASTOLIC DYSFUNCTION: ICD-10-CM

## 2022-11-02 DIAGNOSIS — R00.2 PALPITATIONS: ICD-10-CM

## 2022-11-02 DIAGNOSIS — R42 DIZZINESS: ICD-10-CM

## 2022-11-02 DIAGNOSIS — E78.5 HYPERLIPIDEMIA, UNSPECIFIED HYPERLIPIDEMIA TYPE: ICD-10-CM

## 2022-11-02 DIAGNOSIS — I73.9 ASYMPTOMATIC PVD (PERIPHERAL VASCULAR DISEASE): ICD-10-CM

## 2022-11-02 DIAGNOSIS — I65.23 BILATERAL CAROTID ARTERY STENOSIS: ICD-10-CM

## 2022-11-02 DIAGNOSIS — R06.02 SHORTNESS OF BREATH: ICD-10-CM

## 2022-11-02 DIAGNOSIS — I10 ESSENTIAL HYPERTENSION: ICD-10-CM

## 2022-11-02 PROCEDURE — 93000 ELECTROCARDIOGRAM COMPLETE: CPT | Performed by: NURSE PRACTITIONER

## 2022-11-02 PROCEDURE — 99214 OFFICE O/P EST MOD 30 MIN: CPT | Performed by: NURSE PRACTITIONER

## 2022-11-02 RX ORDER — METOPROLOL SUCCINATE 50 MG/1
50 TABLET, EXTENDED RELEASE ORAL DAILY
Qty: 90 TABLET | Refills: 3 | Status: SHIPPED | OUTPATIENT
Start: 2022-11-02

## 2022-11-02 RX ORDER — OMEPRAZOLE 20 MG/1
20 CAPSULE, DELAYED RELEASE ORAL DAILY
COMMUNITY
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
## 2023-03-29 ENCOUNTER — TELEPHONE (OUTPATIENT)
Dept: CARDIOLOGY | Facility: CLINIC | Age: 53
End: 2023-03-29
Payer: MEDICARE

## 2023-03-29 NOTE — TELEPHONE ENCOUNTER
Per chart review, pt was last seen 11/2/22, we have not done any med changes since then. At that appt, coreg was changed to Metoprolol. No other meds were changed.   Pt declined getting nitro sent in and declined ischemia work up.       Called pt, I informed her of the three medicines she's supposed to take from our office. She had several questions about PCP meds, I advised her to speak w/ PCP about those.     I rescheduled pt's F/U as TW relocated.

## 2023-03-29 NOTE — TELEPHONE ENCOUNTER
Caller: Meri Rankin    Relationship: Self    Best call back number: [phone]    What medications are you currently taking:   Current Outpatient Medications on File Prior to Visit   Medication Sig Dispense Refill   • acetaminophen (TYLENOL) 500 MG tablet Take 500 mg by mouth Every 6 (Six) Hours As Needed for Mild Pain .     • amLODIPine (NORVASC) 10 MG tablet Take 10 mg by mouth Daily.     • aspirin (aspirin) 81 MG EC tablet Take 1 tablet by mouth Daily. 90 tablet 3   • atorvastatin (LIPITOR) 10 MG tablet Take 10 mg by mouth Daily.     • bumetanide (BUMEX) 1 MG tablet Take 1 tablet by mouth As Needed.     • cloNIDine (Catapres) 0.1 MG tablet Take 1 tablet by mouth 3 (Three) Times a Day As Needed for High Blood Pressure (SBP > 160 or DBP > 90). 45 tablet 5   • Dulaglutide (Trulicity) 1.5 MG/0.5ML solution pen-injector Inject  under the skin into the appropriate area as directed 1 (One) Time Per Week.     • DULoxetine (CYMBALTA) 60 MG capsule Take 60 mg by mouth Daily. Total of 90 mg - 60 mg and 30 mg tablet in the am together     • empagliflozin (JARDIANCE) 25 MG tablet tablet Take 25 mg by mouth Daily.     • gabapentin (NEURONTIN) 600 MG tablet Take 600 mg by mouth 2 (Two) Times a Day.     • insulin degludec (Tresiba FlexTouch) 100 UNIT/ML solution pen-injector injection Inject 100 Units under the skin into the appropriate area as directed Daily. Sliding scale     • metoprolol succinate XL (TOPROL-XL) 50 MG 24 hr tablet Take 1 tablet by mouth Daily. 90 tablet 3   • omeprazole (priLOSEC) 20 MG capsule Take 1 capsule by mouth Daily.     • solifenacin (VESICARE) 5 MG tablet Take 5 mg by mouth Daily.       No current facility-administered medications on file prior to visit.                  Who prescribed you this medication: AMY MARCOS    What are your concerns: PT IS NOT SURE WHICH MEDICATIONS SHE SHOULD BE TAKING.

## 2023-04-09 DIAGNOSIS — I10 ESSENTIAL HYPERTENSION: ICD-10-CM

## 2023-04-10 RX ORDER — CARVEDILOL 3.12 MG/1
TABLET ORAL
Qty: 180 TABLET | Refills: 3 | OUTPATIENT
Start: 2023-04-10

## 2023-05-03 ENCOUNTER — OFFICE VISIT (OUTPATIENT)
Dept: CARDIOLOGY | Facility: CLINIC | Age: 53
End: 2023-05-03
Payer: MEDICARE

## 2023-05-03 VITALS
DIASTOLIC BLOOD PRESSURE: 77 MMHG | HEIGHT: 66 IN | HEART RATE: 96 BPM | SYSTOLIC BLOOD PRESSURE: 150 MMHG | OXYGEN SATURATION: 93 % | WEIGHT: 293 LBS | BODY MASS INDEX: 47.09 KG/M2

## 2023-05-03 DIAGNOSIS — I73.9 ASYMPTOMATIC PVD (PERIPHERAL VASCULAR DISEASE): ICD-10-CM

## 2023-05-03 DIAGNOSIS — I45.10 RBBB (RIGHT BUNDLE BRANCH BLOCK): ICD-10-CM

## 2023-05-03 DIAGNOSIS — I65.23 BILATERAL CAROTID ARTERY STENOSIS: ICD-10-CM

## 2023-05-03 DIAGNOSIS — R06.02 SHORTNESS OF BREATH: ICD-10-CM

## 2023-05-03 DIAGNOSIS — G47.33 OSA (OBSTRUCTIVE SLEEP APNEA): ICD-10-CM

## 2023-05-03 DIAGNOSIS — I10 ESSENTIAL HYPERTENSION: Primary | ICD-10-CM

## 2023-05-03 DIAGNOSIS — R42 DIZZINESS: ICD-10-CM

## 2023-05-03 DIAGNOSIS — E11.42 TYPE 2 DIABETES MELLITUS WITH PERIPHERAL NEUROPATHY: ICD-10-CM

## 2023-05-03 DIAGNOSIS — E78.2 MIXED HYPERLIPIDEMIA: ICD-10-CM

## 2023-05-03 DIAGNOSIS — R60.9 PERIPHERAL EDEMA: ICD-10-CM

## 2023-05-03 DIAGNOSIS — I51.89 GRADE I DIASTOLIC DYSFUNCTION: ICD-10-CM

## 2023-05-03 RX ORDER — AMLODIPINE BESYLATE 10 MG/1
10 TABLET ORAL DAILY
Qty: 90 TABLET | Refills: 1 | Status: SHIPPED | OUTPATIENT
Start: 2023-05-03

## 2023-05-03 RX ORDER — HYDROCHLOROTHIAZIDE 25 MG/1
25 TABLET ORAL DAILY
Qty: 90 TABLET | Refills: 1 | Status: SHIPPED | OUTPATIENT
Start: 2023-05-03

## 2023-05-03 RX ORDER — CARVEDILOL 3.12 MG/1
TABLET ORAL 2 TIMES DAILY
COMMUNITY
Start: 2023-01-09 | End: 2023-05-03

## 2023-05-03 RX ORDER — ATORVASTATIN CALCIUM 40 MG/1
40 TABLET, FILM COATED ORAL DAILY
Qty: 90 TABLET | Refills: 1 | Status: SHIPPED | OUTPATIENT
Start: 2023-05-03

## 2023-05-03 RX ORDER — ATORVASTATIN CALCIUM 40 MG/1
40 TABLET, FILM COATED ORAL DAILY
Qty: 90 TABLET | Refills: 1 | Status: SHIPPED | COMMUNITY
Start: 2023-05-03 | End: 2023-05-03 | Stop reason: SDUPTHER

## 2023-05-03 NOTE — PROGRESS NOTES
Subjective   Follow up, hypertension  Meri Rankin is a 52 y.o. female who presents to day for Hypertension (Here for eval. ) and Shortness of Breath.    CHIEF COMPLIANT  Chief Complaint   Patient presents with   • Hypertension     Here for eval.    • Shortness of Breath   Problem list:     1.  Hypertension  2.  Hyperlipidemia  3.  Claudication  3.1 bilateral extremity arterial ultrasound 11/13/2019-no evidence to indicate high-grade/significant peripheral vascular disease  3.2 bilateral lower extremity arterial ultrasound 9/23/2021-no echo Doppler evidence of high-grade stenosis in either leg in any sample area  4.  Right bundle branch block  5.  Type 2 diabetes mellitus for 30 years  6.  Shortness of breath  6.1 echo 1/11/2018-EF 60%, diastolic inflow shows normal relaxation  6.2 Echo 9/23/2021-EF 56 to 60%, moderate LVH, diastolic dysfunction 1  6.3 Echo 5/18/2022-EF 50 to 55%, trace MR, trace TR  7.  REBECCA currently no CPAP, Dr. Valle  8.  Stress test 9/23/2021-low risk study, no ischemia seen, post stress EF 51%  9.  Carotid artery disease  9.1 carotid artery ultrasound 9/23/2021-less than recoded 15% stenosis right internal carotid artery, and as high as 16 to 49% stenosis in the proximal left internal carotid artery, antegrade flow both vertebral arteries      Problem List Items Addressed This Visit        Cardiac and Vasculature    Hyperlipidemia    Relevant Medications    atorvastatin (LIPITOR) 40 MG tablet    Other Relevant Orders    Comprehensive Metabolic Panel    Lipid Panel    Essential hypertension - Primary    Relevant Medications    hydroCHLOROthiazide (HYDRODIURIL) 25 MG tablet    amLODIPine (NORVASC) 10 MG tablet    Asymptomatic PVD (peripheral vascular disease) (HCC)    RBBB (right bundle branch block)    Bilateral carotid artery stenosis    Grade I diastolic dysfunction       Endocrine and Metabolic    Type 2 diabetes mellitus with peripheral neuropathy    Relevant Orders    Ambulatory Referral to  Endocrinology    Hemoglobin A1c       Pulmonary and Pneumonias    Shortness of breath       Sleep    REBECCA (obstructive sleep apnea)    Relevant Orders    Ambulatory Referral to Pulmonology    Home Sleep Study       Symptoms and Signs    Peripheral edema    Dizziness       HPI      She has lost her dad recently and during which time while she is busy for arrangement of the  she notes her blood pressure has been really high up time to 180 and also which has been standing for long time now she is having lower extremity edema she has not been taking her Bumex at home and she has been using the clonidine as needed and about maybe twice a week or so on average she also has sleep apnea diagnosed about 5 years ago but she has not been using the CPAP she still having symptoms of sleep apnea                PRIOR MEDS  Current Outpatient Medications on File Prior to Visit   Medication Sig Dispense Refill   • acetaminophen (TYLENOL) 500 MG tablet Take 1 tablet by mouth Every 6 (Six) Hours As Needed for Mild Pain.     • bumetanide (BUMEX) 1 MG tablet Take 1 tablet by mouth As Needed.     • Dulaglutide 1.5 MG/0.5ML solution pen-injector Inject  under the skin into the appropriate area as directed 1 (One) Time Per Week.     • DULoxetine (CYMBALTA) 60 MG capsule Take 1 capsule by mouth 2 (Two) Times a Day.     • empagliflozin (JARDIANCE) 25 MG tablet tablet Take 1 tablet by mouth Daily.     • gabapentin (NEURONTIN) 600 MG tablet Take 1 tablet by mouth 2 (Two) Times a Day.     • insulin degludec (Tresiba FlexTouch) 100 UNIT/ML solution pen-injector injection Inject 100 Units under the skin into the appropriate area as directed Daily. Sliding scale     • linaclotide (LINZESS) 145 MCG capsule capsule Daily As Needed.     • metoprolol succinate XL (TOPROL-XL) 50 MG 24 hr tablet Take 1 tablet by mouth Daily. 90 tablet 3   • omeprazole (priLOSEC) 20 MG capsule Take 1 capsule by mouth Daily.     • [DISCONTINUED] amLODIPine (NORVASC)  10 MG tablet Take 1 tablet by mouth Daily.     • [DISCONTINUED] atorvastatin (LIPITOR) 10 MG tablet Take 1 tablet by mouth Daily.     • [DISCONTINUED] atorvastatin (LIPITOR) 40 MG tablet Take 1 tablet by mouth Daily. 90 tablet 1   • [DISCONTINUED] cloNIDine (Catapres) 0.1 MG tablet Take 1 tablet by mouth 3 (Three) Times a Day As Needed for High Blood Pressure (SBP > 160 or DBP > 90). 45 tablet 5   • [DISCONTINUED] aspirin (aspirin) 81 MG EC tablet Take 1 tablet by mouth Daily. 90 tablet 3   • [DISCONTINUED] carvedilol (COREG) 3.125 MG tablet 2 (Two) Times a Day.     • [DISCONTINUED] solifenacin (VESICARE) 5 MG tablet Take 1 tablet by mouth Daily.       No current facility-administered medications on file prior to visit.       ALLERGIES  Amoxicillin, Latex, Sulfa antibiotics, and Egg white (egg protein)    HISTORY  Past Medical History:   Diagnosis Date   • Chronic kidney disease    • COVID-19 vaccine administered 2021 - Moderna    • Diabetes mellitus    • Glaucoma    • Hyperlipidemia    • Hypertension    • Sleep apnea        Social History     Socioeconomic History   • Marital status: Single   Tobacco Use   • Smoking status: Former     Packs/day: 1.00     Years: 30.00     Pack years: 30.00     Types: Cigarettes     Quit date: 2012     Years since quittin.2   • Smokeless tobacco: Never   Vaping Use   • Vaping Use: Never used   Substance and Sexual Activity   • Alcohol use: Never   • Drug use: Never   • Sexual activity: Defer       Family History   Problem Relation Age of Onset   • Hypertension Mother    • Hyperlipidemia Mother    • Diabetes Mother    • Parkinsonism Mother    • Cancer Father    • Kidney failure Father    • Hypertension Father    • Hyperlipidemia Father        Review of Systems   Constitutional: Positive for fatigue.   HENT: Negative.    Eyes: Positive for visual disturbance (glasses).   Respiratory: Positive for shortness of breath.    Cardiovascular: Positive for  "chest pain (tightness), palpitations and leg swelling.   Gastrointestinal: Positive for abdominal pain.   Endocrine: Negative.    Genitourinary: Negative.    Musculoskeletal: Negative.    Skin: Negative.    Allergic/Immunologic: Positive for environmental allergies.   Neurological: Negative.    Hematological: Negative.    Psychiatric/Behavioral: Negative.        Objective     VITALS: /77 (BP Location: Left arm, Patient Position: Sitting)   Pulse 96   Ht 167.6 cm (65.98\")   Wt (!) 137 kg (302 lb 9.6 oz)   SpO2 93%   BMI 48.86 kg/m²     LABS:   Lab Results (most recent)     None          IMAGING:   No Images in the past 120 days found..    EXAM:  Physical Exam  Vitals reviewed.   Constitutional:       Appearance: She is well-developed.   HENT:      Head: Normocephalic and atraumatic.   Eyes:      Pupils: Pupils are equal, round, and reactive to light.   Neck:      Thyroid: No thyromegaly.      Vascular: No JVD.   Cardiovascular:      Rate and Rhythm: Normal rate and regular rhythm.      Heart sounds: Normal heart sounds. No murmur heard.    No friction rub. No gallop.   Pulmonary:      Effort: Pulmonary effort is normal. No respiratory distress.      Breath sounds: Normal breath sounds. No stridor. No wheezing or rales.   Chest:      Chest wall: No tenderness.   Musculoskeletal:         General: No tenderness or deformity.      Cervical back: Neck supple.   Skin:     General: Skin is warm and dry.   Neurological:      Mental Status: She is alert and oriented to person, place, and time.      Cranial Nerves: No cranial nerve deficit.      Coordination: Coordination normal.         Procedure   Procedures       Assessment & Plan     Diagnoses and all orders for this visit:    1. Essential hypertension (Primary)  -     hydroCHLOROthiazide (HYDRODIURIL) 25 MG tablet; Take 1 tablet by mouth Daily.  Dispense: 90 tablet; Refill: 1    2. Asymptomatic PVD (peripheral vascular disease) (HCC)    3. Bilateral carotid " artery stenosis    4. Grade I diastolic dysfunction    5. Mixed hyperlipidemia  -     Comprehensive Metabolic Panel; Future  -     Lipid Panel; Future    6. RBBB (right bundle branch block)    7. Shortness of breath    8. REBECCA (obstructive sleep apnea)  -     Ambulatory Referral to Pulmonology  -     Home Sleep Study; Future    9. Dizziness    10. Peripheral edema    11. Type 2 diabetes mellitus with peripheral neuropathy  -     Ambulatory Referral to Endocrinology  -     Hemoglobin A1c; Future    Other orders  -     amLODIPine (NORVASC) 10 MG tablet; Take 1 tablet by mouth Daily.  Dispense: 90 tablet; Refill: 1  -     atorvastatin (LIPITOR) 40 MG tablet; Take 1 tablet by mouth Daily.  Dispense: 90 tablet; Refill: 1    1.  Patient blood pressure has not been well controlled apparently she is also has been taking occasional clonidine as needed so I am going to stop the WA and I will try to keep her on a single medication dose going to add HCTZ 25 mg/day she has not been taking Bumex we will continue with amlodipine and also the metoprolol apparently she could not tolerate lisinopril in the past with severe hyperkalemia going to her I am going to check her lipids and CMP for further assessment and see if she will be able to take any ACE inhibitors or ARB  2.  Her lipids from August with not very well controlled some going to increase the dose of her atorvastatin to 40 mg/day  3.  She has history of sleep apnea she has not been using the CPAP I am going to do a sleep study refer her to pulmonology try to get the CPAP back  5.  Discussed with her day her carotid Doppler which was not critical back in 2021    Return in about 4 weeks (around 5/31/2023).                 MEDS ORDERED DURING VISIT:  New Medications Ordered This Visit   Medications   • hydroCHLOROthiazide (HYDRODIURIL) 25 MG tablet     Sig: Take 1 tablet by mouth Daily.     Dispense:  90 tablet     Refill:  1   • amLODIPine (NORVASC) 10 MG tablet     Sig: Take  1 tablet by mouth Daily.     Dispense:  90 tablet     Refill:  1   • atorvastatin (LIPITOR) 40 MG tablet     Sig: Take 1 tablet by mouth Daily.     Dispense:  90 tablet     Refill:  1       As always, Brian Meza MD  I appreciate very much the opportunity to participate in the cardiovascular care of your patients. Please do not hesitate to call me with any questions with regards to Meri Rankin evaluation and management.         This document has been electronically signed by Richard Harrington MD  May 3, 2023 15:30 EDT    This note is dictated utilizing voice recognition software.

## 2023-06-07 ENCOUNTER — TELEPHONE (OUTPATIENT)
Dept: CARDIOLOGY | Facility: CLINIC | Age: 53
End: 2023-06-07
Payer: MEDICARE

## 2023-06-13 ENCOUNTER — TELEPHONE (OUTPATIENT)
Dept: CARDIOLOGY | Facility: CLINIC | Age: 53
End: 2023-06-13
Payer: MEDICARE

## 2023-11-10 DIAGNOSIS — I10 ESSENTIAL HYPERTENSION: ICD-10-CM

## 2023-11-10 RX ORDER — HYDROCHLOROTHIAZIDE 25 MG/1
25 TABLET ORAL DAILY
Qty: 90 TABLET | Refills: 1 | Status: SHIPPED | OUTPATIENT
Start: 2023-11-10

## 2024-01-25 RX ORDER — ATORVASTATIN CALCIUM 40 MG/1
40 TABLET, FILM COATED ORAL DAILY
Qty: 90 TABLET | Refills: 1 | Status: SHIPPED | OUTPATIENT
Start: 2024-01-25

## 2024-01-25 RX ORDER — AMLODIPINE BESYLATE 10 MG/1
10 TABLET ORAL DAILY
Qty: 90 TABLET | Refills: 1 | Status: SHIPPED | OUTPATIENT
Start: 2024-01-25

## 2024-05-08 DIAGNOSIS — I10 ESSENTIAL HYPERTENSION: ICD-10-CM

## 2024-05-08 RX ORDER — HYDROCHLOROTHIAZIDE 25 MG/1
25 TABLET ORAL DAILY
Qty: 90 TABLET | Refills: 0 | Status: SHIPPED | OUTPATIENT
Start: 2024-05-08

## 2024-07-26 RX ORDER — ATORVASTATIN CALCIUM 40 MG/1
40 TABLET, FILM COATED ORAL DAILY
Qty: 90 TABLET | Refills: 1 | OUTPATIENT
Start: 2024-07-26

## 2024-07-26 RX ORDER — AMLODIPINE BESYLATE 10 MG/1
10 TABLET ORAL DAILY
Qty: 90 TABLET | Refills: 1 | OUTPATIENT
Start: 2024-07-26

## 2024-08-03 DIAGNOSIS — I10 ESSENTIAL HYPERTENSION: ICD-10-CM

## 2024-08-05 RX ORDER — HYDROCHLOROTHIAZIDE 25 MG/1
25 TABLET ORAL DAILY
Qty: 90 TABLET | Refills: 0 | OUTPATIENT
Start: 2024-08-05

## 2025-01-15 ENCOUNTER — TELEPHONE (OUTPATIENT)
Dept: CARDIOLOGY | Facility: CLINIC | Age: 55
End: 2025-01-15

## 2025-01-15 NOTE — TELEPHONE ENCOUNTER
Caller: Meri Rankin    Relationship to patient: Self    Best call back number: [phone]    Chief complaint:     Type of visit: FOLLOW UP    Requested date:  ASAP    If rescheduling, when is the original appointment: NONE     Additional notes:[facility] HAS NO SCHEDULING TIME FRAME, PATIENT WAS LAST SEEN 5.2023. KIDNEY  ADVISED HER TO GET IN ASAP.

## 2025-01-21 NOTE — TELEPHONE ENCOUNTER
Called and made an appointment with Cezar Lindsay,patient not sure if she is going to keep, advise to please call back and cancel if not able to make appointment.

## 2025-02-03 ENCOUNTER — OFFICE VISIT (OUTPATIENT)
Dept: CARDIOLOGY | Facility: CLINIC | Age: 55
End: 2025-02-03
Payer: MEDICARE

## 2025-02-03 VITALS
DIASTOLIC BLOOD PRESSURE: 80 MMHG | BODY MASS INDEX: 47.09 KG/M2 | WEIGHT: 293 LBS | OXYGEN SATURATION: 97 % | SYSTOLIC BLOOD PRESSURE: 130 MMHG | HEIGHT: 66 IN | HEART RATE: 91 BPM

## 2025-02-03 DIAGNOSIS — R07.2 PRECORDIAL CHEST PAIN: ICD-10-CM

## 2025-02-03 DIAGNOSIS — I45.10 RBBB (RIGHT BUNDLE BRANCH BLOCK): ICD-10-CM

## 2025-02-03 DIAGNOSIS — E11.42 TYPE 2 DIABETES MELLITUS WITH PERIPHERAL NEUROPATHY: ICD-10-CM

## 2025-02-03 DIAGNOSIS — I10 ESSENTIAL HYPERTENSION: ICD-10-CM

## 2025-02-03 DIAGNOSIS — G47.33 OSA (OBSTRUCTIVE SLEEP APNEA): ICD-10-CM

## 2025-02-03 DIAGNOSIS — N18.4 CKD (CHRONIC KIDNEY DISEASE) STAGE 4, GFR 15-29 ML/MIN: ICD-10-CM

## 2025-02-03 DIAGNOSIS — R06.02 SHORTNESS OF BREATH: Primary | ICD-10-CM

## 2025-02-03 DIAGNOSIS — E78.2 MIXED HYPERLIPIDEMIA: ICD-10-CM

## 2025-02-03 PROCEDURE — 3079F DIAST BP 80-89 MM HG: CPT | Performed by: SPECIALIST

## 2025-02-03 PROCEDURE — 93000 ELECTROCARDIOGRAM COMPLETE: CPT | Performed by: SPECIALIST

## 2025-02-03 PROCEDURE — 99214 OFFICE O/P EST MOD 30 MIN: CPT | Performed by: SPECIALIST

## 2025-02-03 PROCEDURE — 3075F SYST BP GE 130 - 139MM HG: CPT | Performed by: SPECIALIST

## 2025-02-03 RX ORDER — MULTIPLE VITAMINS W/ MINERALS TAB 9MG-400MCG
1 TAB ORAL DAILY
COMMUNITY

## 2025-02-03 RX ORDER — FUROSEMIDE 40 MG/1
40 TABLET ORAL DAILY
COMMUNITY

## 2025-02-03 RX ORDER — DULAGLUTIDE 4.5 MG/.5ML
INJECTION, SOLUTION SUBCUTANEOUS
COMMUNITY
Start: 2025-01-30

## 2025-02-03 NOTE — PROGRESS NOTES
Subjective   Follow up, chest pain, shortness of breath, HTN  Meri Rankin is a 54 y.o. female who presents to day for Follow-up (Cardiac management - last seen 5/2023), labs (1/14/2025), medication (Went over verbally //No daily aspirin //Patient brought in medicine list to appointment, it's been reviewed with patient and med list was updated in the chart.  ), and Chest Pain (She states she got REALLY sick around monika and has not been able to get over the pain / tightness along with palpitations ).    CHIEF COMPLIANT  Chief Complaint   Patient presents with    Follow-up     Cardiac management - last seen 5/2023    labs     1/14/2025    medication     Went over verbally     No daily aspirin     Patient brought in medicine list to appointment, it's been reviewed with patient and med list was updated in the chart.      Chest Pain     She states she got REALLY sick around monika and has not been able to get over the pain / tightness along with palpitations        Active Problems:    1.  Hypertension  2.  Hyperlipidemia  3.  Claudication  3.1 bilateral extremity arterial ultrasound 11/13/2019-no evidence to indicate high-grade/significant peripheral vascular disease  3.2 bilateral lower extremity arterial ultrasound 9/23/2021-no echo Doppler evidence of high-grade stenosis in either leg in any sample area  4.  Right bundle branch block  5.  Type 2 diabetes mellitus for 30 years  6.  Shortness of breath  6.1 echo 1/11/2018-EF 60%, diastolic inflow shows normal relaxation  6.2 Echo 9/23/2021-EF 56 to 60%, moderate LVH, diastolic dysfunction 1  6.3 Echo 5/18/2022-EF 50 to 55%, trace MR, trace TR  7.  REBECCA currently no CPAP, Dr. Valle  8.  Stress test 9/23/2021-low risk study, no ischemia seen, post stress EF 51%  9.  Carotid artery disease  9.1 carotid artery ultrasound 9/23/2021-less than recoded 15% stenosis right internal carotid artery, and as high as 16 to 49% stenosis in the proximal left internal carotid  artery, antegrade flow both vertebral arteries    HPI  HPI    History of Present Illness  The patient presents for evaluation of chest pressure, shortness of breath, and sleep apnea.    She experienced a severe, undiagnosed illness characterized by intense pulmonary discomfort persisting for a week. Since this episode, she has been experiencing tachycardia during physical activities such as taking out the trash, accompanied by dyspnea and chest pressure. These symptoms have been present for the past few months and are not associated with stress. She also reports occasional palpitations, as experienced earlier today when contemplating her visit to the cardiologist. She does not monitor her blood pressure at home but plans to start doing so. Her nephrologist, Dr. Sandoval, has advised her to seek cardiological evaluation due to her reported symptoms of palpitations and dyspnea. She was previously on atorvastatin, which was discontinued by her nephrologist in July 2024. She has not had a cholesterol panel since then.    She has a history of sleep apnea and has not undergone a recent sleep study. She is uncertain about the presence of snoring as she currently sleeps in a chair and does not have a partner to observe her sleep patterns.    She reports significant leg edema, which has improved by approximately 90 percent following the initiation of hydrochlorothiazide therapy two weeks ago.    Her blood glucose levels have improved, although they remain elevated at around 180.    She has been diagnosed with renal insufficiency.    MEDICATIONS  Current: Hydrochlorothiazide  Discontinued: Atorvastatin       PRIOR MEDS  Current Outpatient Medications on File Prior to Visit   Medication Sig Dispense Refill    acetaminophen (TYLENOL) 500 MG tablet Take 1 tablet by mouth Every 6 (Six) Hours As Needed for Mild Pain.      amLODIPine (NORVASC) 10 MG tablet TAKE ONE TABLET BY MOUTH DAILY 90 tablet 1    Cyanocobalamin (VITAMIN B 12 PO)  Take  by mouth.      empagliflozin (JARDIANCE) 25 MG tablet tablet Take 1 tablet by mouth Daily.      Ergocalciferol (VITAMIN D2 PO) Take 1.25 tablets by mouth 1 (One) Time Per Week.      furosemide (LASIX) 40 MG tablet Take 1 tablet by mouth Daily.      hydroCHLOROthiazide 25 MG tablet TAKE 1 TABLET BY MOUTH DAILY 90 tablet 0    insulin degludec (Tresiba FlexTouch) 100 UNIT/ML solution pen-injector injection Inject 120 Units under the skin into the appropriate area as directed Daily. Sliding scale      linaclotide (LINZESS) 145 MCG capsule capsule Daily As Needed.      metoprolol succinate XL (TOPROL-XL) 50 MG 24 hr tablet Take 1 tablet by mouth Daily. 90 tablet 3    Multiple Vitamins-Minerals (HAIR SKIN & NAILS PO) Take  by mouth.      multivitamin with minerals tablet tablet Take 1 tablet by mouth Daily.      Trulicity 4.5 MG/0.5ML solution auto-injector       [DISCONTINUED] atorvastatin (LIPITOR) 40 MG tablet TAKE ONE TABLET BY MOUTH DAILY 90 tablet 1    [DISCONTINUED] bumetanide (BUMEX) 1 MG tablet Take 1 tablet by mouth As Needed. (Patient not taking: Reported on 2/3/2025)      [DISCONTINUED] Dulaglutide 1.5 MG/0.5ML solution pen-injector Inject  under the skin into the appropriate area as directed 1 (One) Time Per Week. (Patient not taking: Reported on 2/3/2025)      [DISCONTINUED] DULoxetine (CYMBALTA) 60 MG capsule Take 1 capsule by mouth 2 (Two) Times a Day. (Patient not taking: Reported on 2/3/2025)      [DISCONTINUED] gabapentin (NEURONTIN) 600 MG tablet Take 1 tablet by mouth 2 (Two) Times a Day. (Patient not taking: Reported on 2/3/2025)      [DISCONTINUED] omeprazole (priLOSEC) 20 MG capsule Take 1 capsule by mouth Daily. (Patient not taking: Reported on 2/3/2025)       No current facility-administered medications on file prior to visit.       ALLERGIES  Amoxicillin, Bactrim [sulfamethoxazole-trimethoprim], Latex, Sulfa antibiotics, and Egg white (egg protein)    HISTORY  Past Medical History:    Diagnosis Date    Chronic kidney disease     COVID-19 vaccine administered 2021 - Moderna     Diabetes mellitus     Glaucoma     Hyperlipidemia     Hypertension     Kidney failure     Sleep apnea        Social History     Socioeconomic History    Marital status: Single   Tobacco Use    Smoking status: Former     Current packs/day: 0.00     Average packs/day: 1 pack/day for 30.0 years (30.0 ttl pk-yrs)     Types: Cigarettes     Start date: 1982     Quit date: 2012     Years since quittin.9    Smokeless tobacco: Never   Vaping Use    Vaping status: Never Used   Substance and Sexual Activity    Alcohol use: Never    Drug use: Never    Sexual activity: Defer       Family History   Problem Relation Age of Onset    Hypertension Mother     Hyperlipidemia Mother     Diabetes Mother     Parkinsonism Mother     Cancer Father     Kidney failure Father     Hypertension Father     Hyperlipidemia Father        Review of Systems   Constitutional:  Positive for chills and fatigue. Negative for fever.   HENT:  Positive for congestion and sore throat. Negative for rhinorrhea.    Eyes:  Positive for visual disturbance.   Respiratory:  Positive for chest tightness and shortness of breath. Negative for wheezing.    Cardiovascular:  Positive for chest pain and palpitations. Negative for leg swelling.   Gastrointestinal: Negative.    Endocrine: Negative.    Genitourinary: Negative.    Musculoskeletal: Negative.  Negative for arthralgias, back pain and neck pain.   Skin: Negative.    Allergic/Immunologic: Negative.  Negative for environmental allergies.   Neurological:  Positive for headaches. Negative for dizziness, syncope and weakness.   Hematological: Negative.  Does not bruise/bleed easily.   Psychiatric/Behavioral: Negative.  Negative for sleep disturbance.        Objective     VITALS: /80 (BP Location: Left arm, Patient Position: Sitting, Cuff Size: Adult)   Pulse 91   Ht 167.6 cm  "(65.98\")   Wt (!) 139 kg (306 lb 9.6 oz)   SpO2 97%   BMI 49.52 kg/m²     LABS:   Lab Results (most recent)       None            IMAGING:   No Images in the past 120 days found..    EXAM:  Physical Exam  Vitals reviewed.   Constitutional:       Appearance: She is well-developed.   HENT:      Head: Normocephalic and atraumatic.   Eyes:      Pupils: Pupils are equal, round, and reactive to light.   Neck:      Thyroid: No thyromegaly.      Vascular: No JVD.   Cardiovascular:      Rate and Rhythm: Normal rate and regular rhythm.      Heart sounds: Normal heart sounds. No murmur heard.     No friction rub. No gallop.   Pulmonary:      Effort: Pulmonary effort is normal. No respiratory distress.      Breath sounds: Normal breath sounds. No stridor. No wheezing or rales.   Chest:      Chest wall: No tenderness.   Musculoskeletal:         General: No tenderness or deformity.      Cervical back: Neck supple.   Skin:     General: Skin is warm and dry.   Neurological:      Mental Status: She is alert and oriented to person, place, and time.      Cranial Nerves: No cranial nerve deficit.      Coordination: Coordination normal.       Physical Exam  Lungs are clear.       Procedure     ECG 12 Lead    Date/Time: 2/3/2025 2:56 PM  Performed by: Richard Harrington MD    Authorized by: Richard Harrington MD  Comparison: compared with previous ECG from 11/2/2022       EKG: NSR, RBBB, possible old inferior infarct, compare with EKG on 11/2/2022 no significant changes     Results  Laboratory Studies  Blood sugar is 180 and lower.    Testing  EKG shows sinus rhythm with right bundle branch block.       Assessment & Plan     Diagnoses and all orders for this visit:    1. Shortness of breath (Primary)  -     Stress Test With Myocardial Perfusion One Day; Future  -     Adult Transthoracic Echo Complete w/ Color, Spectral and Contrast if necessary per protocol; Future    2. Precordial chest pain  -     Stress Test With Myocardial Perfusion One " Day; Future  -     Adult Transthoracic Echo Complete w/ Color, Spectral and Contrast if necessary per protocol; Future    3. Mixed hyperlipidemia  -     Lipid Panel; Future  -     Comprehensive Metabolic Panel; Future    4. RBBB (right bundle branch block)    5. Type 2 diabetes mellitus with peripheral neuropathy    6. Essential hypertension    7. CKD (chronic kidney disease) stage 4, GFR 15-29 ml/min    8. REBECCA (obstructive sleep apnea)  -     Home Sleep Study; Future    Other orders  -     ECG 12 Lead      Assessment & Plan  1. Chest pressure and dyspnea.  Her EKG indicates a normal sinus rhythm, but there is evidence of a right bundle branch block. The computer suggested a possible old myocardial infarction, but this is not confirmed. Given her renal condition, there is an increased risk of arterial blockages. However, her blood pressure is currently within normal limits, so no changes to her medication regimen are necessary at this time. A chemical stress test will be conducted to evaluate her cardiac function. An echocardiogram will also be performed to assess the strength of her heart. A cholesterol panel will be ordered to monitor her lipid levels. She has been advised to avoid heavy lifting prior to the stress test.    2. Sleep apnea.  She has a history of sleep apnea and continues to experience symptoms such as snoring and sleeping in a chair. A home sleep study will be arranged to determine if she still has sleep apnea.    3. Leg swelling.  She reports significant improvement in leg swelling after being prescribed hydrochlorothiazide.    4. Diabetes mellitus.  Her blood sugar levels have improved but remain high, typically around 180.    5. Renal insufficiency.  She has been diagnosed with renal insufficiency.       Return After stress test.      Advance Care Planning   ACP discussion was held with the patient during this visit. Patient does not have an advance directive, declines further assistance.              MEDS ORDERED DURING VISIT:  No orders of the defined types were placed in this encounter.        As always, Carrington Franco MD  I appreciate very much the opportunity to participate in the cardiovascular care of your patients. Please do not hesitate to call me with any questions with regards to Meri Avendanoy evaluation and management.     Patient or patient representative verbalized consent for the use of Ambient Listening during the visit with  Richard Harrington MD for chart documentation. 2/3/2025  14:56 EST       This document has been electronically signed by Richard Harrington MD  February 3, 2025 15:17 EST    This note is dictated utilizing voice recognition software.

## 2025-02-24 ENCOUNTER — TELEPHONE (OUTPATIENT)
Dept: CARDIOLOGY | Facility: CLINIC | Age: 55
End: 2025-02-24
Payer: MEDICARE

## 2025-02-24 DIAGNOSIS — R06.02 SHORTNESS OF BREATH: Primary | ICD-10-CM

## 2025-02-24 DIAGNOSIS — G47.33 OSA (OBSTRUCTIVE SLEEP APNEA): ICD-10-CM

## 2025-02-24 NOTE — TELEPHONE ENCOUNTER
Patient does not want to do the in home sleep study, she would like to go to a facility to have this done, please advise.

## 2025-02-24 NOTE — TELEPHONE ENCOUNTER
Richard Harrington MD Hedrick, Mari-Alice, MA  Caller: Unspecified (Today, 10:01 AM)  Yes we can refer to the sleep lab, thank you    [facility] for patient    RELAY:    In facility sleep study has been ordered as requested.

## 2025-02-24 NOTE — TELEPHONE ENCOUNTER
Caller: Meri Rankin    Relationship: Self    Best call back number: [phone]     What is the best time to reach you: ANY     What was the call regarding: PT IS WONDERING WHY SHE WOULD NEED TO PRE REG FOR THE TESTING COMING UP ON 3/7. ASKED IF SHE WOULD BE PUT UNDER. PLEASE ADVISE.     Is it okay if the provider responds through MyChart: CALL

## 2025-03-05 ENCOUNTER — TELEPHONE (OUTPATIENT)
Dept: CARDIOLOGY | Facility: CLINIC | Age: 55
End: 2025-03-05

## 2025-03-05 NOTE — TELEPHONE ENCOUNTER
Caller: Meri Rankin    Relationship: Self    Best call back number: [phone]    What is the best time to reach you: ANY    Who are you requesting to speak with (clinical staff, provider,  specific staff member): CLINICAL    What was the call regarding: PT IS AWARE THAT THERE SHOULD HAVE BEEN AN ORDERED PLACED FOR A SLEEP STUDY TO BE DONE AT A OVERNIGHT FACILITY. BUT SHE HAS YET TO HEAR ANYTHING ABOUT THIS AND IS WONDERING IF THE COMPANY HAS RECEIVED THE ORDER

## 2025-03-06 DIAGNOSIS — G47.33 OSA (OBSTRUCTIVE SLEEP APNEA): Primary | ICD-10-CM

## 2025-03-06 NOTE — TELEPHONE ENCOUNTER
Caller: Meri Rankin    Relationship: Self    Best call back number: [phone]     What is the best time to reach you: ANY LEAVE VM     Who are you requesting to speak with (clinical staff, provider,  specific staff member): [email or web address]     What was the call regarding: PT REPORTS THAT THEY WERE GIVEN A CALL BUT THERE IS NO DOCUMENTATION IN THE CHART ABOUT THIS. IF THIS WAS NOT A MISTAKE, PLEASE CALL PT BACK.     STATES THIS IS ABOUT LAB SLEEP STUDY, IF APPT HAS BEEN MADE PLEASE LEAVE INFO ON PTS VM     Is it okay if the provider responds through MyChart: CALL

## 2025-03-07 ENCOUNTER — HOSPITAL ENCOUNTER (OUTPATIENT)
Dept: CARDIOLOGY | Facility: HOSPITAL | Age: 55
Discharge: HOME | End: 2025-03-07
Payer: MEDICARE

## 2025-03-07 DIAGNOSIS — R06.02 SHORTNESS OF BREATH: ICD-10-CM

## 2025-03-07 DIAGNOSIS — R07.2 PRECORDIAL CHEST PAIN: ICD-10-CM

## 2025-03-07 LAB
AV MEAN PRESS GRAD SYS DOP V1V2: 2.5 MMHG
AV VMAX SYS DOP: 99.1 CM/SEC
BH CV ECHO MEAS - ACS: 1.68 CM
BH CV ECHO MEAS - AO MAX PG: 3.9 MMHG
BH CV ECHO MEAS - AO ROOT DIAM: 2.9 CM
BH CV ECHO MEAS - AO V2 VTI: 19.5 CM
BH CV ECHO MEAS - EDV(CUBED): 71 ML
BH CV ECHO MEAS - EDV(MOD-SP4): 150 ML
BH CV ECHO MEAS - EF(MOD-SP4): 43.4 %
BH CV ECHO MEAS - ESV(CUBED): 24.6 ML
BH CV ECHO MEAS - ESV(MOD-SP4): 84.9 ML
BH CV ECHO MEAS - FS: 29.7 %
BH CV ECHO MEAS - IVS/LVPW: 1 CM
BH CV ECHO MEAS - IVSD: 1.85 CM
BH CV ECHO MEAS - LA DIMENSION: 4 CM
BH CV ECHO MEAS - LAT PEAK E' VEL: 8.8 CM/SEC
BH CV ECHO MEAS - LV DIASTOLIC VOL/BSA (35-75): 63.3 CM2
BH CV ECHO MEAS - LV MASS(C)D: 342.5 GRAMS
BH CV ECHO MEAS - LV SYSTOLIC VOL/BSA (12-30): 35.8 CM2
BH CV ECHO MEAS - LVIDD: 4.1 CM
BH CV ECHO MEAS - LVIDS: 2.9 CM
BH CV ECHO MEAS - LVPWD: 1.85 CM
BH CV ECHO MEAS - MED PEAK E' VEL: 4.9 CM/SEC
BH CV ECHO MEAS - MV A MAX VEL: 87.8 CM/SEC
BH CV ECHO MEAS - MV DEC TIME: 0.23 SEC
BH CV ECHO MEAS - MV E MAX VEL: 66.8 CM/SEC
BH CV ECHO MEAS - MV E/A: 0.76
BH CV ECHO MEAS - RVDD: 2.43 CM
BH CV ECHO MEAS - SV(MOD-SP4): 65.1 ML
BH CV ECHO MEAS - SVI(MOD-SP4): 27.5 ML/M2
BH CV ECHO MEASUREMENTS AVERAGE E/E' RATIO: 9.75
BH CV REST NUCLEAR ISOTOPE DOSE: 10 MCI
BH CV STRESS COMMENTS STAGE 1: NORMAL
BH CV STRESS DOSE REGADENOSON STAGE 1: 0.4
BH CV STRESS DURATION MIN STAGE 1: 0
BH CV STRESS DURATION SEC STAGE 1: 10
BH CV STRESS NUCLEAR ISOTOPE DOSE: 30 MCI
BH CV STRESS PROTOCOL 1: NORMAL
BH CV STRESS RECOVERY BP: NORMAL MMHG
BH CV STRESS RECOVERY HR: 93 BPM
BH CV STRESS STAGE 1: 1
LEFT ATRIUM VOLUME INDEX: 19 ML/M2
MAXIMAL PREDICTED HEART RATE: 164 BPM
PERCENT MAX PREDICTED HR: 58.54 %
SPECT HRT GATED+EF W RNC IV: 55 %
STRESS BASELINE BP: NORMAL MMHG
STRESS BASELINE HR: 76 BPM
STRESS PERCENT HR: 69 %
STRESS POST PEAK BP: NORMAL MMHG
STRESS POST PEAK HR: 96 BPM
STRESS TARGET HR: 139 BPM

## 2025-03-07 PROCEDURE — 34310000005 TECHNETIUM SESTAMIBI: Performed by: SPECIALIST

## 2025-03-07 PROCEDURE — A9500 TC99M SESTAMIBI: HCPCS | Performed by: SPECIALIST

## 2025-03-07 PROCEDURE — 25010000002 REGADENOSON 0.4 MG/5ML SOLUTION: Performed by: SPECIALIST

## 2025-03-07 PROCEDURE — 93017 CV STRESS TEST TRACING ONLY: CPT

## 2025-03-07 PROCEDURE — 93306 TTE W/DOPPLER COMPLETE: CPT

## 2025-03-07 PROCEDURE — 78452 HT MUSCLE IMAGE SPECT MULT: CPT

## 2025-03-07 RX ORDER — REGADENOSON 0.08 MG/ML
0.4 INJECTION, SOLUTION INTRAVENOUS
Status: COMPLETED | OUTPATIENT
Start: 2025-03-07 | End: 2025-03-07

## 2025-03-07 RX ADMIN — REGADENOSON 0.4 MG: 0.08 INJECTION, SOLUTION INTRAVENOUS at 13:02

## 2025-03-07 RX ADMIN — TECHNETIUM TC 99M SESTAMIBI 1 DOSE: 1 INJECTION INTRAVENOUS at 13:02

## 2025-03-07 RX ADMIN — TECHNETIUM TC 99M SESTAMIBI 1 DOSE: 1 INJECTION INTRAVENOUS at 08:11

## 2025-05-29 ENCOUNTER — OFFICE VISIT (OUTPATIENT)
Dept: CARDIOLOGY | Facility: CLINIC | Age: 55
End: 2025-05-29
Payer: MEDICARE

## 2025-05-29 VITALS
BODY MASS INDEX: 47.09 KG/M2 | DIASTOLIC BLOOD PRESSURE: 96 MMHG | WEIGHT: 293 LBS | SYSTOLIC BLOOD PRESSURE: 182 MMHG | HEIGHT: 66 IN | OXYGEN SATURATION: 97 % | HEART RATE: 82 BPM

## 2025-05-29 DIAGNOSIS — R06.02 SHORTNESS OF BREATH: ICD-10-CM

## 2025-05-29 DIAGNOSIS — N18.4 CKD (CHRONIC KIDNEY DISEASE) STAGE 4, GFR 15-29 ML/MIN: ICD-10-CM

## 2025-05-29 DIAGNOSIS — R07.2 PRECORDIAL CHEST PAIN: Primary | ICD-10-CM

## 2025-05-29 DIAGNOSIS — I10 ESSENTIAL HYPERTENSION: ICD-10-CM

## 2025-05-29 RX ORDER — CARVEDILOL 6.25 MG/1
6.25 TABLET ORAL 2 TIMES DAILY
Qty: 60 TABLET | Refills: 3 | Status: SHIPPED | OUTPATIENT
Start: 2025-05-29

## 2025-05-29 RX ORDER — CLONIDINE HYDROCHLORIDE 0.1 MG/1
0.1 TABLET ORAL 2 TIMES DAILY
Qty: 60 TABLET | Refills: 3 | Status: SHIPPED | OUTPATIENT
Start: 2025-05-29

## 2025-05-29 NOTE — PROGRESS NOTES
Subjective     Meri Rankin is a 54 y.o. female who presents today for Follow-up (Testing- Dr. Harrington ).    CHIEF COMPLIANT  Chief Complaint   Patient presents with    Follow-up     Testing- Dr. Harrington        Active Problems:  1.  Hypertension  2.  Hyperlipidemia  3.  Claudication  3.1 bilateral extremity arterial ultrasound 11/13/2019-no evidence to indicate high-grade/significant peripheral vascular disease  3.2 bilateral lower extremity arterial ultrasound 9/23/2021-no echo Doppler evidence of high-grade stenosis in either leg in any sample area  4.  Right bundle branch block  5.  Type 2 diabetes mellitus for 30 years  6.  Shortness of breath  6.1 echo 1/11/2018-EF 60%, diastolic inflow shows normal relaxation  6.2 Echo 9/23/2021-EF 56 to 60%, moderate LVH, diastolic dysfunction 1  6.3 Echo 5/18/2022-EF 50 to 55%, trace MR, trace TR  6.4 echocardiogram 3/7/2025: EF 56 to 60%.  Moderate to severe concentric hypertrophy.  Left ventricular wall segments are hypokinetic, mid inferolateral and mid inferior.  Grade 1 diastolic dysfunction.  7.  REBECCA currently no CPAP, Dr. Valle  8.  Stress test 9/23/2021-low risk study, no ischemia seen, post stress EF 51%  8.1 Stress 3/7/25: Consistent with low risk study.  Mild fixed basal to mid inferior wall defect with normal wall motions, most likely this represents diaphragmatic attenuation artifact, less likely represents inferior infarction, no significant ischemia seen, TID 0.97.   9.  Carotid artery disease  9.1 carotid artery ultrasound 9/23/2021-less than recoded 15% stenosis right internal carotid artery, and as high as 16 to 49% stenosis in the proximal left internal carotid artery, antegrade flow both vertebral arteries    HPI  The patient is a 54 year old female that returns for follow to discuss recent testing.  Stress Test showed a mild fixed basal to mid inferior wall defect with normal wall motions, most likely this represents diaphragmatic attenuation artifact, less  likely represents inferior infarction, no significant ischemia seen, TID 0.97. Echocardiogram showed EF 56 to 60%.  Moderate to severe concentric hypertrophy.  Left ventricular wall segments are hypokinetic, mid inferolateral and mid inferior.  Grade 1 diastolic dysfunction.  She has had no further chest pain since her last visit.  She was having increased dyspnea but feels this has also improved since her last visit.  Her BP has been running elevated.  It is 182/96 in the office and the states it is not uncommon for her blood pressure to be this elevated.  She denies syncope, near syncope or palpitations.  She does have chronic kidney disease with creatinine around 2.4.  She does follow closely with Dr. Sandoval.    PRIOR MEDS  Current Outpatient Medications on File Prior to Visit   Medication Sig Dispense Refill    acetaminophen (TYLENOL) 500 MG tablet Take 1 tablet by mouth Every 6 (Six) Hours As Needed for Mild Pain.      amLODIPine (NORVASC) 10 MG tablet TAKE ONE TABLET BY MOUTH DAILY 90 tablet 1    Cyanocobalamin (VITAMIN B 12 PO) Take  by mouth.      empagliflozin (JARDIANCE) 25 MG tablet tablet Take 1 tablet by mouth Daily.      Ergocalciferol (VITAMIN D2 PO) Take 1.25 tablets by mouth 1 (One) Time Per Week.      furosemide (LASIX) 40 MG tablet Take 1 tablet by mouth Daily. (Patient taking differently: Take 1 tablet by mouth As Needed.)      hydroCHLOROthiazide 25 MG tablet TAKE 1 TABLET BY MOUTH DAILY 90 tablet 0    insulin degludec (Tresiba FlexTouch) 100 UNIT/ML solution pen-injector injection Inject 120 Units under the skin into the appropriate area as directed Daily. Sliding scale      linaclotide (LINZESS) 145 MCG capsule capsule Daily As Needed.      Multiple Vitamins-Minerals (HAIR SKIN & NAILS PO) Take  by mouth.      multivitamin with minerals tablet tablet Take 1 tablet by mouth Daily.      Trulicity 4.5 MG/0.5ML solution auto-injector       [DISCONTINUED] metoprolol succinate XL (TOPROL-XL) 50 MG  "24 hr tablet Take 1 tablet by mouth Daily. 90 tablet 3     No current facility-administered medications on file prior to visit.       ALLERGIES  Amoxicillin, Bactrim [sulfamethoxazole-trimethoprim], Latex, Sulfa antibiotics, and Egg white (egg protein)    HISTORY  Past Medical History:   Diagnosis Date    Chronic kidney disease     COVID-19 vaccine administered 2021 - Moderna     Diabetes mellitus     Glaucoma     Hyperlipidemia     Hypertension     Kidney failure     Sleep apnea        Social History     Socioeconomic History    Marital status: Single   Tobacco Use    Smoking status: Former     Current packs/day: 0.00     Average packs/day: 1 pack/day for 30.0 years (30.0 ttl pk-yrs)     Types: Cigarettes     Start date: 1982     Quit date: 2012     Years since quittin.2    Smokeless tobacco: Never   Vaping Use    Vaping status: Never Used   Substance and Sexual Activity    Alcohol use: Never    Drug use: Never    Sexual activity: Defer       Family History   Problem Relation Age of Onset    Hypertension Mother     Hyperlipidemia Mother     Diabetes Mother     Parkinsonism Mother     Cancer Father     Kidney failure Father     Hypertension Father     Hyperlipidemia Father        Review of Systems   Constitutional:  Positive for fatigue.   Respiratory:  Negative for chest tightness and shortness of breath.    Cardiovascular:  Positive for leg swelling (BLE edema- goes down some overnight). Negative for chest pain and palpitations.   Neurological:  Positive for weakness (Generalized weakness- she feels from lack of activity x7 years), numbness (From calf down BLE) and headaches (Occasionally). Negative for dizziness.   Psychiatric/Behavioral:  Positive for sleep disturbance (Off and on sleep issues- states she sleeps like a log or not at all).        Objective     VITALS: BP (!) 182/96   Pulse 82   Ht 167.6 cm (65.98\")   Wt (!) 138 kg (305 lb)   SpO2 97%   BMI 49.26 kg/m² "     LABS:   Lab Results (most recent)       None            IMAGING:   No Images in the past 120 days found..    EXAM:  Physical Exam  Constitutional:       Appearance: Normal appearance.   Eyes:      Pupils: Pupils are equal, round, and reactive to light.   Cardiovascular:      Rate and Rhythm: Normal rate and regular rhythm.      Pulses:           Carotid pulses are 2+ on the right side and 2+ on the left side.       Radial pulses are 2+ on the right side and 2+ on the left side.        Dorsalis pedis pulses are 2+ on the right side and 2+ on the left side.        Posterior tibial pulses are 2+ on the right side and 2+ on the left side.      Heart sounds: Normal heart sounds.   Pulmonary:      Effort: Pulmonary effort is normal.      Breath sounds: Normal breath sounds.   Abdominal:      General: Bowel sounds are normal.      Palpations: Abdomen is soft.   Musculoskeletal:      Right lower leg: Edema present.      Left lower leg: Edema present.   Skin:     General: Skin is warm and dry.      Capillary Refill: Capillary refill takes less than 2 seconds.   Neurological:      General: No focal deficit present.      Mental Status: She is alert and oriented to person, place, and time.   Psychiatric:         Mood and Affect: Mood normal.         Thought Content: Thought content normal.         Procedure   Procedures       Assessment & Plan    Diagnosis Plan   1. Precordial chest pain        2. Essential hypertension        3. Shortness of breath        4. CKD (chronic kidney disease) stage 4, GFR 15-29 ml/min          Plan:  1.  Precordial chest pain: There is a possible mild inferior fixed defect on stress but no significant ischemia identified.  The patient does have some inferior hypokinesis on echocardiogram as well.  She denies current chest pain and does feel dyspnea has improved since her last visit.  As she is currently relatively asymptomatic and there are no areas of ischemia on stress will avoid further  testing which includes contrast dye due to CKD.  The patient was advised to notify our office if she develops any symptoms of chest pain or returning dyspnea.  She verbalizes an understanding.  2.  Essential hypertension: The patient's blood pressure has been running elevated.  Will stop metoprolol succinate and change the patient to carvedilol.  Will continue amlodipine.  Will add clonidine 0.1 mg p.o. twice daily.  Unable to use ACE/ARB/Arni due to CKD.  Will continue hydrochlorothiazide.  The patient was instructed to monitor BP at home and to notify our office if systolic BP is consistently greater than 130-135 systolic.  Goal BP is 130-135/70-80.  Will bring her back in 2 weeks for a nurse visit to reevaluate blood pressure and we will continue to adjust medications at that time.  We did discuss potentially obtaining a renal artery ultrasound but after discussion with the patient she prefers to wait on this at this time.  She is going to contact her insurance company to see what the cost would be.  3.  Shortness of breath: The patient does feel that dyspnea has improved since her last visit.  She is on Jardiance.  She is on hydrochlorothiazide.  She does have Lasix to use as needed but due to CKD tries to avoid use unless absolutely necessary.  4.  CKD: She follows closely with Dr. Sandoval.  Will avoid nephrotoxic agents.      Return in about 3 months (around 8/29/2025) for Nurse visit for BP check 2 weeks .    Meri Rankin  reports that she quit smoking about 13 years ago. Her smoking use included cigarettes. She started smoking about 43 years ago. She has a 30 pack-year smoking history. She has never used smokeless tobacco.     Class 3 Severe Obesity (BMI >=40). Obesity-related health conditions include the following: obstructive sleep apnea, hypertension, coronary heart disease, and dyslipidemias.     MEDS ORDERED DURING VISIT:  New Medications Ordered This Visit   Medications    carvedilol (COREG) 6.25 MG  tablet     Sig: Take 1 tablet by mouth 2 (Two) Times a Day.     Dispense:  60 tablet     Refill:  3     D/c metoprolol    cloNIDine (CATAPRES) 0.1 MG tablet     Sig: Take 1 tablet by mouth 2 (Two) Times a Day.     Dispense:  60 tablet     Refill:  3       DISCONTINUED MEDS DURING VISIT:   Medications Discontinued During This Encounter   Medication Reason    metoprolol succinate XL (TOPROL-XL) 50 MG 24 hr tablet           This document has been electronically signed by HAROON Patel  May 29, 2025 17:18 EDT    Dictated Utilizing Dragon Dictation: Part of this note may be an electronic transcription/translation of spoken language to printed text using the Dragon Dictation System

## 2025-06-13 ENCOUNTER — CLINICAL SUPPORT (OUTPATIENT)
Dept: CARDIOLOGY | Facility: CLINIC | Age: 55
End: 2025-06-13
Payer: MEDICARE

## 2025-06-13 VITALS — DIASTOLIC BLOOD PRESSURE: 79 MMHG | HEART RATE: 76 BPM | OXYGEN SATURATION: 97 % | SYSTOLIC BLOOD PRESSURE: 178 MMHG

## 2025-06-13 DIAGNOSIS — I10 ESSENTIAL HYPERTENSION: Primary | ICD-10-CM

## 2025-06-13 RX ORDER — HYDRALAZINE HYDROCHLORIDE 25 MG/1
25 TABLET, FILM COATED ORAL 2 TIMES DAILY
Qty: 60 TABLET | Refills: 11 | Status: SHIPPED | OUTPATIENT
Start: 2025-06-13

## 2025-06-13 RX ORDER — METOPROLOL SUCCINATE 50 MG/1
50 TABLET, EXTENDED RELEASE ORAL DAILY
COMMUNITY

## 2025-06-13 NOTE — PROGRESS NOTES
Meri Rankin  1970 6/13/2025   ?   No chief complaint on file.     ?   HPI:   Patient presents for blood pressure check in office due to recent elevated blood pressure with medication changes. Patient stopped taking carvedilol and clonidine due to severe swelling, gurgling, and coughing since last Saturday, symptoms improved after stopping the medication. She has currently been taking metoprolol succinate 50mg daily, amlodipine 10mg and HCTZ 25mg. Patient reports she has not been checking her blood pressure. She has had some swelling from the hip down that has started to get better. She has not had any chest pain, palpitations, or headache.  ?   ?     Current Outpatient Medications:     acetaminophen (TYLENOL) 500 MG tablet, Take 1 tablet by mouth Every 6 (Six) Hours As Needed for Mild Pain., Disp: , Rfl:     amLODIPine (NORVASC) 10 MG tablet, TAKE ONE TABLET BY MOUTH DAILY, Disp: 90 tablet, Rfl: 1    carvedilol (COREG) 6.25 MG tablet, Take 1 tablet by mouth 2 (Two) Times a Day., Disp: 60 tablet, Rfl: 3    cloNIDine (CATAPRES) 0.1 MG tablet, Take 1 tablet by mouth 2 (Two) Times a Day., Disp: 60 tablet, Rfl: 3    Cyanocobalamin (VITAMIN B 12 PO), Take  by mouth., Disp: , Rfl:     empagliflozin (JARDIANCE) 25 MG tablet tablet, Take 1 tablet by mouth Daily., Disp: , Rfl:     Ergocalciferol (VITAMIN D2 PO), Take 1.25 tablets by mouth 1 (One) Time Per Week., Disp: , Rfl:     furosemide (LASIX) 40 MG tablet, Take 1 tablet by mouth Daily. (Patient taking differently: Take 1 tablet by mouth As Needed.), Disp: , Rfl:     hydroCHLOROthiazide 25 MG tablet, TAKE 1 TABLET BY MOUTH DAILY, Disp: 90 tablet, Rfl: 0    insulin degludec (Tresiba FlexTouch) 100 UNIT/ML solution pen-injector injection, Inject 120 Units under the skin into the appropriate area as directed Daily. Sliding scale, Disp: , Rfl:     linaclotide (LINZESS) 145 MCG capsule capsule, Daily As Needed., Disp: , Rfl:     Multiple Vitamins-Minerals (HAIR SKIN &  NAILS PO), Take  by mouth., Disp: , Rfl:     multivitamin with minerals tablet tablet, Take 1 tablet by mouth Daily., Disp: , Rfl:     Trulicity 4.5 MG/0.5ML solution auto-injector, , Disp: , Rfl:    ?   ?   Amoxicillin, Bactrim [sulfamethoxazole-trimethoprim], Latex, Sulfa antibiotics, and Egg white (egg protein)       Procedures     ?   Assessment & Plan    ?   Per Gaviota Maxwell the patient is to begin hydralazine 25mg twice daily. She is to call in 2-3 weeks with an update on her blood pressure and symptoms. Patient is to keep her current follow up appointment and contact the office with any new symptoms.?   ?

## 2025-07-08 ENCOUNTER — TELEPHONE (OUTPATIENT)
Dept: CARDIOLOGY | Facility: CLINIC | Age: 55
End: 2025-07-08

## 2025-08-11 RX ORDER — HYDRALAZINE HYDROCHLORIDE 25 MG/1
25 TABLET, FILM COATED ORAL 2 TIMES DAILY
Qty: 180 TABLET | Refills: 3 | Status: SHIPPED | OUTPATIENT
Start: 2025-08-11